# Patient Record
Sex: FEMALE | Race: WHITE | Employment: FULL TIME | URBAN - METROPOLITAN AREA
[De-identification: names, ages, dates, MRNs, and addresses within clinical notes are randomized per-mention and may not be internally consistent; named-entity substitution may affect disease eponyms.]

---

## 2023-07-20 ENCOUNTER — HOSPITAL ENCOUNTER (OUTPATIENT)
Age: 38
Discharge: HOME | End: 2023-07-20
Payer: COMMERCIAL

## 2023-07-20 VITALS
DIASTOLIC BLOOD PRESSURE: 66 MMHG | RESPIRATION RATE: 20 BRPM | OXYGEN SATURATION: 100 % | SYSTOLIC BLOOD PRESSURE: 130 MMHG | TEMPERATURE: 98 F | HEART RATE: 81 BPM

## 2023-07-20 DIAGNOSIS — M79.12 STERNOCLEIDOMASTOID MUSCLE TENDERNESS: Primary | ICD-10-CM

## 2023-07-20 PROCEDURE — 99203 OFFICE O/P NEW LOW 30 MIN: CPT | Performed by: PHYSICIAN ASSISTANT

## 2023-07-20 RX ORDER — CYCLOBENZAPRINE HCL 10 MG
10 TABLET ORAL 3 TIMES DAILY PRN
Qty: 14 TABLET | Refills: 0 | Status: SHIPPED | OUTPATIENT
Start: 2023-07-20 | End: 2023-07-27

## 2023-07-20 RX ORDER — IBUPROFEN 600 MG/1
TABLET ORAL
Qty: 20 TABLET | Refills: 0 | Status: SHIPPED | OUTPATIENT
Start: 2023-07-20

## 2023-07-20 NOTE — ED INITIAL ASSESSMENT (HPI)
Brushed the right side of neck and noticed it to be more prominent and sore . Denies sore throat, uri symptoms, HA or stomach ache. Feel generally well.

## 2023-08-03 ENCOUNTER — OFFICE VISIT (OUTPATIENT)
Dept: INTERNAL MEDICINE CLINIC | Facility: CLINIC | Age: 38
End: 2023-08-03

## 2023-08-03 VITALS
BODY MASS INDEX: 27.77 KG/M2 | SYSTOLIC BLOOD PRESSURE: 113 MMHG | OXYGEN SATURATION: 98 % | TEMPERATURE: 98 F | HEART RATE: 99 BPM | DIASTOLIC BLOOD PRESSURE: 68 MMHG | HEIGHT: 70 IN | WEIGHT: 194 LBS

## 2023-08-03 DIAGNOSIS — E55.9 VITAMIN D DEFICIENCY: ICD-10-CM

## 2023-08-03 DIAGNOSIS — Z00.00 ANNUAL PHYSICAL EXAM: Primary | ICD-10-CM

## 2023-08-03 DIAGNOSIS — E53.8 B12 DEFICIENCY: ICD-10-CM

## 2023-08-03 PROCEDURE — 3074F SYST BP LT 130 MM HG: CPT | Performed by: INTERNAL MEDICINE

## 2023-08-03 PROCEDURE — 99385 PREV VISIT NEW AGE 18-39: CPT | Performed by: INTERNAL MEDICINE

## 2023-08-03 PROCEDURE — 3008F BODY MASS INDEX DOCD: CPT | Performed by: INTERNAL MEDICINE

## 2023-08-03 PROCEDURE — 3078F DIAST BP <80 MM HG: CPT | Performed by: INTERNAL MEDICINE

## 2023-08-03 RX ORDER — FLUOXETINE HYDROCHLORIDE 40 MG/1
40 CAPSULE ORAL DAILY
COMMUNITY
Start: 2023-07-20

## 2023-08-09 ENCOUNTER — LAB ENCOUNTER (OUTPATIENT)
Dept: LAB | Age: 38
End: 2023-08-09
Attending: INTERNAL MEDICINE
Payer: COMMERCIAL

## 2023-08-09 DIAGNOSIS — E55.9 VITAMIN D DEFICIENCY: ICD-10-CM

## 2023-08-09 DIAGNOSIS — Z00.00 ANNUAL PHYSICAL EXAM: ICD-10-CM

## 2023-08-09 DIAGNOSIS — E53.8 B12 DEFICIENCY: ICD-10-CM

## 2023-08-09 LAB
ALBUMIN SERPL-MCNC: 3.6 G/DL (ref 3.4–5)
ALBUMIN/GLOB SERPL: 0.9 {RATIO} (ref 1–2)
ALP LIVER SERPL-CCNC: 102 U/L
ALT SERPL-CCNC: 21 U/L
ANION GAP SERPL CALC-SCNC: 4 MMOL/L (ref 0–18)
AST SERPL-CCNC: 15 U/L (ref 15–37)
BASOPHILS # BLD AUTO: 0.03 X10(3) UL (ref 0–0.2)
BASOPHILS NFR BLD AUTO: 0.6 %
BILIRUB SERPL-MCNC: 0.7 MG/DL (ref 0.1–2)
BUN BLD-MCNC: 8 MG/DL (ref 7–18)
BUN/CREAT SERPL: 11.4 (ref 10–20)
CALCIUM BLD-MCNC: 8.5 MG/DL (ref 8.5–10.1)
CHLORIDE SERPL-SCNC: 107 MMOL/L (ref 98–112)
CHOLEST SERPL-MCNC: 138 MG/DL (ref ?–200)
CO2 SERPL-SCNC: 28 MMOL/L (ref 21–32)
CREAT BLD-MCNC: 0.7 MG/DL
DEPRECATED RDW RBC AUTO: 40.9 FL (ref 35.1–46.3)
EGFRCR SERPLBLD CKD-EPI 2021: 115 ML/MIN/1.73M2 (ref 60–?)
EOSINOPHIL # BLD AUTO: 0.04 X10(3) UL (ref 0–0.7)
EOSINOPHIL NFR BLD AUTO: 0.8 %
ERYTHROCYTE [DISTWIDTH] IN BLOOD BY AUTOMATED COUNT: 12.4 % (ref 11–15)
FASTING PATIENT LIPID ANSWER: YES
FASTING STATUS PATIENT QL REPORTED: YES
GLOBULIN PLAS-MCNC: 4.1 G/DL (ref 2.8–4.4)
GLUCOSE BLD-MCNC: 85 MG/DL (ref 70–99)
HCT VFR BLD AUTO: 40.3 %
HDLC SERPL-MCNC: 52 MG/DL (ref 40–59)
HGB BLD-MCNC: 13 G/DL
IMM GRANULOCYTES # BLD AUTO: 0.01 X10(3) UL (ref 0–1)
IMM GRANULOCYTES NFR BLD: 0.2 %
LDLC SERPL CALC-MCNC: 65 MG/DL (ref ?–100)
LYMPHOCYTES # BLD AUTO: 1.64 X10(3) UL (ref 1–4)
LYMPHOCYTES NFR BLD AUTO: 31.2 %
MCH RBC QN AUTO: 29.1 PG (ref 26–34)
MCHC RBC AUTO-ENTMCNC: 32.3 G/DL (ref 31–37)
MCV RBC AUTO: 90.2 FL
MONOCYTES # BLD AUTO: 0.35 X10(3) UL (ref 0.1–1)
MONOCYTES NFR BLD AUTO: 6.7 %
NEUTROPHILS # BLD AUTO: 3.18 X10 (3) UL (ref 1.5–7.7)
NEUTROPHILS # BLD AUTO: 3.18 X10(3) UL (ref 1.5–7.7)
NEUTROPHILS NFR BLD AUTO: 60.5 %
NONHDLC SERPL-MCNC: 86 MG/DL (ref ?–130)
OSMOLALITY SERPL CALC.SUM OF ELEC: 286 MOSM/KG (ref 275–295)
PLATELET # BLD AUTO: 319 10(3)UL (ref 150–450)
POTASSIUM SERPL-SCNC: 3.6 MMOL/L (ref 3.5–5.1)
PROT SERPL-MCNC: 7.7 G/DL (ref 6.4–8.2)
RBC # BLD AUTO: 4.47 X10(6)UL
SODIUM SERPL-SCNC: 139 MMOL/L (ref 136–145)
TRIGL SERPL-MCNC: 117 MG/DL (ref 30–149)
TSI SER-ACNC: 0.96 MIU/ML (ref 0.36–3.74)
VIT B12 SERPL-MCNC: 371 PG/ML (ref 193–986)
VIT D+METAB SERPL-MCNC: 42.2 NG/ML (ref 30–100)
VLDLC SERPL CALC-MCNC: 18 MG/DL (ref 0–30)
WBC # BLD AUTO: 5.3 X10(3) UL (ref 4–11)

## 2023-08-09 PROCEDURE — 82306 VITAMIN D 25 HYDROXY: CPT

## 2023-08-09 PROCEDURE — 80061 LIPID PANEL: CPT

## 2023-08-09 PROCEDURE — 85025 COMPLETE CBC W/AUTO DIFF WBC: CPT

## 2023-08-09 PROCEDURE — 84443 ASSAY THYROID STIM HORMONE: CPT

## 2023-08-09 PROCEDURE — 36415 COLL VENOUS BLD VENIPUNCTURE: CPT

## 2023-08-09 PROCEDURE — 80053 COMPREHEN METABOLIC PANEL: CPT

## 2023-08-09 PROCEDURE — 82607 VITAMIN B-12: CPT

## 2023-08-10 NOTE — PROGRESS NOTES
Message left for patient that Her labs are okay except her B12 is low normal I will suggest to take over-the-counter 1000 mcg daily.

## 2023-12-18 ENCOUNTER — OFFICE VISIT (OUTPATIENT)
Dept: INTERNAL MEDICINE CLINIC | Facility: CLINIC | Age: 38
End: 2023-12-18

## 2023-12-18 VITALS
HEIGHT: 70 IN | TEMPERATURE: 98 F | WEIGHT: 197 LBS | OXYGEN SATURATION: 99 % | RESPIRATION RATE: 17 BRPM | SYSTOLIC BLOOD PRESSURE: 116 MMHG | BODY MASS INDEX: 28.2 KG/M2 | DIASTOLIC BLOOD PRESSURE: 86 MMHG | HEART RATE: 67 BPM

## 2023-12-18 DIAGNOSIS — M79.652 PAIN OF LEFT THIGH: Primary | ICD-10-CM

## 2023-12-18 PROCEDURE — 3074F SYST BP LT 130 MM HG: CPT | Performed by: INTERNAL MEDICINE

## 2023-12-18 PROCEDURE — 3079F DIAST BP 80-89 MM HG: CPT | Performed by: INTERNAL MEDICINE

## 2023-12-18 PROCEDURE — 3008F BODY MASS INDEX DOCD: CPT | Performed by: INTERNAL MEDICINE

## 2023-12-18 PROCEDURE — 99213 OFFICE O/P EST LOW 20 MIN: CPT | Performed by: INTERNAL MEDICINE

## 2023-12-18 NOTE — PROGRESS NOTES
Subjective:   Patient ID: Sharmin Guerra is a 41year old female. HPI  Patient comes in today with complaint of pain to the back of the thigh right above the knee area started hurting last night feels kind bruise pain with pressing on the area no swelling no redness she was little more active than usual yesterday jumping around with the son. No direct injury trauma no recent travel long distance or sitting down for prolonged time, today feels little bit better but still discomfort and tender       History/Other:   Review of Systems   Constitutional: Negative. HENT: Negative. Eyes: Negative. Respiratory: Negative. Cardiovascular: Negative. Gastrointestinal: Negative. Genitourinary: Negative. Musculoskeletal: Negative. Pain to back or left thigh    Skin: Negative. Neurological: Negative. Psychiatric/Behavioral: Negative. Current Outpatient Medications   Medication Sig Dispense Refill    FLUoxetine HCl 40 MG Oral Cap Take 1 capsule (40 mg total) by mouth daily. JUNEL FE 1/20 1-20 MG-MCG Oral Tab Take 1 tablet by mouth daily. FLUoxetine HCl 10 MG Oral Cap Take 1 capsule (10 mg total) by mouth daily. Allergies: Allergies   Allergen Reactions    Amoxicillin HIVES and RASH     Adverse reaction         Objective:   Physical Exam  Vitals and nursing note reviewed. Constitutional:       Appearance: She is well-developed. HENT:      Head: Normocephalic and atraumatic. Right Ear: External ear normal.      Left Ear: External ear normal.      Nose: Nose normal.   Eyes:      Conjunctiva/sclera: Conjunctivae normal.      Pupils: Pupils are equal, round, and reactive to light. Cardiovascular:      Rate and Rhythm: Normal rate and regular rhythm. Heart sounds: Normal heart sounds. Pulmonary:      Effort: Pulmonary effort is normal.      Breath sounds: Normal breath sounds.    Abdominal:      General: Bowel sounds are normal.      Palpations: Abdomen is soft.   Genitourinary:     Vagina: Normal.   Musculoskeletal:         General: Tenderness present. Normal range of motion. Cervical back: Normal range of motion and neck supple. Comments: Pain to back or left thigh, pain with pressure,    Skin:     General: Skin is warm and dry. Neurological:      Mental Status: She is alert and oriented to person, place, and time. Deep Tendon Reflexes: Reflexes are normal and symmetric. Psychiatric:         Behavior: Behavior normal.         Thought Content: Thought content normal.         Judgment: Judgment normal.         Assessment & Plan:   1. Pain of left thigh most likley a muscle /ligament strain or small tear, rest, ice as need med for pain , unlikley a dvt witht the presnting symptms, let us know if not better        No orders of the defined types were placed in this encounter.       Meds This Visit:  Requested Prescriptions      No prescriptions requested or ordered in this encounter       Imaging & Referrals:  None

## 2024-02-16 ENCOUNTER — HOSPITAL ENCOUNTER (OUTPATIENT)
Age: 39
Discharge: HOME | End: 2024-02-16
Payer: COMMERCIAL

## 2024-02-16 VITALS
DIASTOLIC BLOOD PRESSURE: 61 MMHG | HEART RATE: 109 BPM | SYSTOLIC BLOOD PRESSURE: 110 MMHG | OXYGEN SATURATION: 98 % | RESPIRATION RATE: 18 BRPM | TEMPERATURE: 97 F

## 2024-02-16 DIAGNOSIS — J02.9 SORE THROAT: ICD-10-CM

## 2024-02-16 DIAGNOSIS — J02.0 STREP PHARYNGITIS: Primary | ICD-10-CM

## 2024-02-16 LAB
POCT INFLUENZA A: NEGATIVE
POCT INFLUENZA B: NEGATIVE
S PYO AG THROAT QL: POSITIVE

## 2024-02-16 PROCEDURE — 87880 STREP A ASSAY W/OPTIC: CPT | Performed by: NURSE PRACTITIONER

## 2024-02-16 PROCEDURE — 87502 INFLUENZA DNA AMP PROBE: CPT | Performed by: NURSE PRACTITIONER

## 2024-02-16 PROCEDURE — 99213 OFFICE O/P EST LOW 20 MIN: CPT | Performed by: NURSE PRACTITIONER

## 2024-02-16 RX ORDER — AZITHROMYCIN 500 MG/1
500 TABLET, FILM COATED ORAL DAILY
Qty: 5 TABLET | Refills: 0 | Status: SHIPPED | OUTPATIENT
Start: 2024-02-16 | End: 2024-02-21

## 2024-02-16 NOTE — DISCHARGE INSTRUCTIONS
Take Tylenol and/or ibuprofen as needed for pain or fever.  Drink plenty of fluids, rest is much as possible.  Take the antibiotic as prescribed for the next 5 days.  Take with food.  Thank you for choosing our [facility] for your medical condition today. Please be sure to follow up with your primary care provider in 2 days if no improvement, or as directed. If any worsening of your symptoms or other concerns call your primary care provider, return here or go to the emergency department.

## 2024-02-16 NOTE — ED PROVIDER NOTES
Chief Complaint   Patient presents with    Cough/URI    Sore Throat       HPI:     Janice Lara is a 38 year old female who presents with a chief complaint of sore throat, fatigue, chills, sweating, headache, tactile fever, and nausea for the past 3 days.  Patient did not check her temperature at home with a thermometer.  She denies vomiting or diarrhea.  She denies difficulty breathing, chest pain or abdominal pain.  She denies rhinorrhea or cough.  She states her child was ill last week with similar symptoms that was a virus.  She does work in a school.      PFSH    PFS asessment screens reviewed and agree.  Nurses notes reviewed I agree with documentation.    Family History   Problem Relation Age of Onset    Other (liver) Father     No Known Problems Mother     Diabetes Maternal Grandmother     Diabetes Paternal Grandmother      Family history is reviewed and is as noted in HPI.  Social History     Socioeconomic History    Marital status:      Spouse name: Not on file    Number of children: Not on file    Years of education: Not on file    Highest education level: Not on file   Occupational History    Not on file   Tobacco Use    Smoking status: Former     Years: 5     Types: Cigarettes    Smokeless tobacco: Never   Vaping Use    Vaping Use: Former   Substance and Sexual Activity    Alcohol use: Not Currently    Drug use: Never    Sexual activity: Yes     Partners: Male   Other Topics Concern    Not on file   Social History Narrative    Not on file     Social Determinants of Health     Financial Resource Strain: Not on file   Food Insecurity: Not on file   Transportation Needs: Not on file   Physical Activity: Not on file   Stress: Not on file   Social Connections: Not on file   Housing Stability: Not on file         ROS:   Positive for stated complaint: Sore throat.  All other systems reviewed and negative except as noted above.  Constitutional and Vital Signs Reviewed.      Physical Exam:      Findings:    /61   Pulse 109   Temp 97.3 °F (36.3 °C) (Temporal)   Resp 18   LMP 01/29/2024 (Approximate)   SpO2 98%   GENERAL: well developed, well nourished, well hydrated, no distress  SKIN: good skin turgor, no obvious rashes  NECK: supple, mild bilateral anterior cervical LAD.  CARDIO: RRR without murmur, Cap refill brisk  EXTREMITIES: NAYAK without difficulty  GI: soft, non-tender to palpation  HEAD: normocephalic, atraumatic, no facial asymmetry  EYES: bilateral sclera non icteric, no conjunctival injection or discharge, no periorbital swelling  EARS: Bilateral EACs clear, TMs without erythema or bulging, COL wnl,   NOSE: nasal mucosa pink, no discharge or bleeding  THROAT: airway patent, no intraoral lesions.  Bilateral tonsils 2+, cryptic, and mildly erythematous.  No exudates, uvula midline,   LUNGS: Full symmetric AE, clear to auscultation bilaterally; no rales, rhonchi, or wheezes, No signs of increased WOB  NEURO: No observed focal deficits, speech clear  PSYCH: Alert and oriented x3.  Answering questions appropriately.  Mood appropriate.    MDM/Assessment/Plan:   Orders for this encounter:    Orders Placed This Encounter    POCT Rapid Strep    POCT Flu Test     Order Specific Question:   Release to patient     Answer:   Immediate    POCT Rapid Strep    azithromycin 500 MG Oral Tab     Sig: Take 1 tablet (500 mg total) by mouth daily for 5 days.     Dispense:  5 tablet     Refill:  0       Labs performed this visit:  Recent Results (from the past 10 hour(s))   POCT Flu Test    Collection Time: 02/16/24 12:11 PM    Specimen: Nares; Other   Result Value Ref Range    POCT INFLUENZA A Negative Negative    POCT INFLUENZA B Negative Negative   POCT Rapid Strep    Collection Time: 02/16/24 12:20 PM   Result Value Ref Range    POCT Rapid Strep Positive (A) Negative       MDM:  Differential diagnosis includes strep pharyngitis, viral pharyngitis, viral upper respiratory infection, influenza, viral  syndrome.  Will check rapid strep and influenza at this time.  Rapid strep is positive, influenza is negative will treat with azithromycin as patient is allergic to amoxicillin.  Given discharge instructions and advised to follow-up with primary care provider next week.     Counseled: Patient, regarding diagnosis, regarding treatment plan, regarding diagnostic results, regarding prescription, I have discussed with the patient the results of tests, differential diagnosis, and warning signs and symptoms that should prompt immediate return. The patient understands these instructions and agrees to the follow-up plan provided. There is no barriers to learning. Appropriate f/u given. Emergency precautions given. Patient agrees to return for any concerns/ problems/complications.      Diagnosis:    ICD-10-CM    1. Strep pharyngitis  J02.0       2. Sore throat  J02.9 POCT Flu Test     POCT Flu Test          All results reviewed and discussed with patient.  See AVS for detailed discharge instructions for your condition today.    Follow Up with:  Guerda Marrufo DO  [address]  [place] [address]  [phone]    In 3 days

## 2024-02-18 ENCOUNTER — HOSPITAL ENCOUNTER (OUTPATIENT)
Age: 39
Discharge: HOME | End: 2024-02-18
Payer: COMMERCIAL

## 2024-02-18 ENCOUNTER — E-VISIT (OUTPATIENT)
Dept: TELEHEALTH | Age: 39
End: 2024-02-18
Payer: COMMERCIAL

## 2024-02-18 VITALS
OXYGEN SATURATION: 100 % | HEART RATE: 90 BPM | SYSTOLIC BLOOD PRESSURE: 108 MMHG | RESPIRATION RATE: 18 BRPM | DIASTOLIC BLOOD PRESSURE: 66 MMHG | TEMPERATURE: 97 F

## 2024-02-18 DIAGNOSIS — H10.9 BACTERIAL CONJUNCTIVITIS OF RIGHT EYE: Primary | ICD-10-CM

## 2024-02-18 DIAGNOSIS — J02.9 PHARYNGITIS, UNSPECIFIED ETIOLOGY: ICD-10-CM

## 2024-02-18 DIAGNOSIS — J03.90 TONSILLITIS: ICD-10-CM

## 2024-02-18 DIAGNOSIS — Z02.9 ADMINISTRATIVE ENCOUNTER: Primary | ICD-10-CM

## 2024-02-18 RX ORDER — POLYMYXIN B SULFATE AND TRIMETHOPRIM 1; 10000 MG/ML; [USP'U]/ML
1 SOLUTION OPHTHALMIC
Qty: 10 ML | Refills: 0 | Status: SHIPPED | OUTPATIENT
Start: 2024-02-18 | End: 2024-02-23

## 2024-02-18 RX ORDER — DEXAMETHASONE SODIUM PHOSPHATE 10 MG/ML
10 INJECTION, SOLUTION INTRAMUSCULAR; INTRAVENOUS ONCE
Status: COMPLETED | OUTPATIENT
Start: 2024-02-18 | End: 2024-02-18

## 2024-02-18 NOTE — ED PROVIDER NOTES
Patient Seen in: [facility]      History     Chief Complaint   Patient presents with    Conjunctivitis     Stated Complaint: eye issue    Subjective:   HPI    Patient is a healthy 38-year-old female who presents to immediate care due to red eye x 2 days.  Notes increased redness and purulent drainage this morning of her right eye.  States currently she is being treated for strep pharyngitis with azithromycin.  Notes minor improvement in sore throat but notes persistent sinus congestion and postnasal drip.  Has been taking Tylenol ibuprofen for pain.  Denies fever difficulty swallowing, drooling, cough.    Objective:   Past Medical History:   Diagnosis Date    Anxiety     Cervical dysplasia 2011    Mild    Depression     Dysmenorrhea     Nervous disorder     Seasonal allergies     spring    Varicella               History reviewed. No pertinent surgical history.             Social History     Socioeconomic History    Marital status:    Tobacco Use    Smoking status: Former     Years: 5     Types: Cigarettes    Smokeless tobacco: Never   Vaping Use    Vaping Use: Former   Substance and Sexual Activity    Alcohol use: Not Currently    Drug use: Never    Sexual activity: Yes     Partners: Male              Review of Systems    Positive for stated complaint: eye issue  Other systems are as noted in HPI.  Constitutional and vital signs reviewed.      All other systems reviewed and negative except as noted above.    Physical Exam     ED Triage Vitals [02/18/24 0825]   /66   Pulse 90   Resp 18   Temp 97 °F (36.1 °C)   Temp src Temporal   SpO2 100 %   O2 Device None (Room air)       Current:/66   Pulse 90   Temp 97 °F (36.1 °C) (Temporal)   Resp 18   LMP 01/29/2024 (Approximate)   SpO2 100%     Right Eye Chart Acuity: 20/40, Corrected  Left Eye Chart Acuity: 20/40, Corrected    Physical Exam    Vital signs reviewed. Nursing note reviewed.  Constitutional: Well-developed. Well-nourished.  In no acute distress  HENT: Mucous membranes moist. TMs intact bilaterally. No trismus. Uvula midline. Mild posterior pharynx erythema.  Enlarged bilateral tonsils with exudates.  no petechiae, or posterior pharynx edema.  EYES: right conjunctival injection. PERRL EOMI without pain.   NECK: Full ROM. Supple.   CARDIAC: Normal rate. Normal S1/ S2. 2+ distal pulses. No edema  PULM/CHEST: Clear to auscultation bilaterally. No wheezes  Extremities: Full ROM  NEURO: Awake, alert, following commands, moving extremities, answering questions.   SKIN: Warm and dry. No rash or lesions.  PSYCH: Normal judgment. Normal affect.        ED Course   Labs Reviewed - No data to display                   MDM      Patient is a 38-year-old female that presents to immediate care due to right red eye x 2 days.  Patient arrives with stable vitals speaking complete sentences in no respiratory distress.  Physical exam showing mild injection of right conjunctiva.  Most likely bacterial conjunctivitis secondary to recent URI, viral illness.  Less likely viral conjunctivitis, corneal abrasion, uveitis.  Will prescribe Polytrim eyedrops for presumed bacterial conjunctivitis.  Patient additionally being treated for strep pharyngitis with azithromycin.  Notes persistent pain.  On physical exam patient having bilateral enlarged tonsils with exudates.    Unlikely PTA or retropharyngeal abscess.  Most likely tonsillitis secondary to strep pharyngitis. Due to persistent pain, will give dose of 10 mg Decadron prior to discharge.  Encourage patient to continue Tylenol ibuprofen for pain.  For sinus congestion postnasal drip to begin taking antihistamine such as Claritin Zyrtec, decongestant such as Sudafed.  History given by patient.  Patient agreeable to plan all questions answered.  Return precautions including worsening pain difficulty swallowing drooling.                                   Medical Decision Making      Disposition and Plan      Clinical Impression:  1. Bacterial conjunctivitis of right eye    2. Pharyngitis, unspecified etiology    3. Tonsillitis         Disposition:  Discharge  2/18/2024  8:42 am    Follow-up:  Guerda Marrufo DO  [address]  [place] [address]  [phone]    Call             Medications Prescribed:  Discharge Medication List as of 2/18/2024  8:47 AM        START taking these medications    Details   polymyxin B-trimethoprim 12488-3.1 UNIT/ML-% Ophthalmic Solution Apply 1 drop to eye Q3H While Awake for 5 days., Normal, Disp-10 mL, R-0

## 2024-02-18 NOTE — ED INITIAL ASSESSMENT (HPI)
Patient reports right eye redness and crust since yesterday. Patient reports being on day three of abx for strep throat.

## 2024-02-18 NOTE — PROGRESS NOTES
Pt submitted evisit for eye redness and discharge.   Upon reviewing chart, noted pt seen at [facility] today for same complaint and prescribed antibiotic drops.   Message sent to pt that evisit will not be completed.

## 2024-02-18 NOTE — DISCHARGE INSTRUCTIONS
Take antihistamines and decongestants for sinus congestion and postnasal drip cough.  At nighttime take 1 to 2 tablets, 25 mg to 50 mg tablets of diphenhydramine (Benadryl) to help with sinus congestion and cough.  You may additionally take 1 tablet of Claritin or Zyrtec during the day to help with sinus congestion.  For added decongestion relief, you may additionally take pseudoephedrine (Sudafed).  Take Tylenol and ibuprofen as needed for pain.  Seek prompt medical reevaluation if you begin to have worsening pain, difficulty swallowing, drooling, shortness of breath or chest pain.

## 2024-02-24 ENCOUNTER — HOSPITAL ENCOUNTER (OUTPATIENT)
Age: 39
Discharge: HOME | End: 2024-02-24
Payer: COMMERCIAL

## 2024-02-24 VITALS
SYSTOLIC BLOOD PRESSURE: 120 MMHG | DIASTOLIC BLOOD PRESSURE: 72 MMHG | RESPIRATION RATE: 16 BRPM | OXYGEN SATURATION: 97 % | TEMPERATURE: 98 F | HEART RATE: 88 BPM

## 2024-02-24 DIAGNOSIS — H57.89 EYE IRRITATION: Primary | ICD-10-CM

## 2024-02-24 PROCEDURE — 99213 OFFICE O/P EST LOW 20 MIN: CPT | Performed by: PHYSICIAN ASSISTANT

## 2024-02-24 RX ORDER — CIPROFLOXACIN HYDROCHLORIDE 3.5 MG/ML
2 SOLUTION/ DROPS TOPICAL SEE ADMIN INSTRUCTIONS
Qty: 5 ML | Refills: 0 | Status: SHIPPED | OUTPATIENT
Start: 2024-02-24 | End: 2024-03-02

## 2024-02-24 NOTE — ED PROVIDER NOTES
Patient Seen in: [facility]      History     Chief Complaint   Patient presents with    Eye Problem     Stated Complaint: Eye Problem    Subjective:   HPI    38-year-old female presents for persistent mild irritation in bilateral eyes.  Patient recently treated with antibiotic eyedrops for presumed conjunctivitis.  Patient states symptoms started in her right eye and then transferred to her left eye throughout the week.  Reports persistent mild film over the eye and scant discharge upon wakening.  Mild eye redness. No visual disturbances. No FB sensation.  Patient does wear contacts but has not been wearing in the last week.  Does not routinely sleep with her contacts in.  Denies any other symptoms.  No history of autoimmune disease.      Objective:   Past Medical History:   Diagnosis Date    Anxiety     Cervical dysplasia 2011    Mild    Depression     Dysmenorrhea     Nervous disorder     Seasonal allergies     spring    Varicella               History reviewed. No pertinent surgical history.             Social History     Socioeconomic History    Marital status:    Tobacco Use    Smoking status: Former     Years: 5     Types: Cigarettes    Smokeless tobacco: Never   Vaping Use    Vaping Use: Former   Substance and Sexual Activity    Alcohol use: Not Currently    Drug use: Never    Sexual activity: Yes     Partners: Male              Review of Systems    Positive for stated complaint: Eye Problem  Other systems are as noted in HPI.  Constitutional and vital signs reviewed.      All other systems reviewed and negative except as noted above.    Physical Exam     ED Triage Vitals [02/24/24 0831]   /72   Pulse 88   Resp 16   Temp 97.7 °F (36.5 °C)   Temp src Temporal   SpO2 97 %   O2 Device None (Room air)       Current:/72   Pulse 88   Temp 97.7 °F (36.5 °C) (Temporal)   Resp 16   LMP 01/29/2024 (Approximate)   SpO2 97%         Physical Exam  Vitals and nursing note reviewed.    Constitutional:       General: She is not in acute distress.     Appearance: Normal appearance. She is not ill-appearing or toxic-appearing.   HENT:      Head: Normocephalic.      Nose: Nose normal.      Mouth/Throat:      Mouth: Mucous membranes are moist.   Eyes:      Comments: Minimally injected conjunctival bilaterally.  No discharge.  No appreciated foreign body.  Eyelids unremarkable.  Both eyes visualized under fluorescein stain.  No corneal ulcer or appreciated uptake.   Cardiovascular:      Rate and Rhythm: Normal rate.   Pulmonary:      Effort: Pulmonary effort is normal. No respiratory distress.   Musculoskeletal:         General: Normal range of motion.      Cervical back: Normal range of motion.   Skin:     General: Skin is warm.   Neurological:      General: No focal deficit present.      Mental Status: She is alert.   Psychiatric:         Mood and Affect: Mood normal.         Behavior: Behavior normal.             ED Course   Labs Reviewed - No data to display                 MDM                                      Medical Decision Making    38-year-old female returns with persistent bilateral eye irritation after recent diagnosis of conjunctivitis.  Mild relief after antibiotic drops.  Differential corneal ulcer, allergic conjunctivitis, uveitis. Patient is a contact lens wear.  Has not been wearing her contacts.  Vital signs are stable.  Visual acuity is reassuring.  Fluorescein exam on bilateral eyes unremarkable. No systemic symptoms. Home with cipro eye drops for pseudomonas coverage. Advised sx care. F/U with ophthalmologist if symptoms persists. ER precautions advised.     Disposition and Plan     Clinical Impression:  1. Eye irritation         Disposition:  Discharge  2/24/2024  8:53 am    Follow-up:  Guerda Marrufo DO  [address]  [place] [address]  [phone]          Craig Winters MD  [address] [facility]  [address]  [facility]  [address]  [phone]      Opthalmology    [facility] [address] [address]  [phone]              Medications Prescribed:  Discharge Medication List as of 2/24/2024  8:55 AM

## 2024-02-24 NOTE — ED INITIAL ASSESSMENT (HPI)
Patient is here with drainage from her eyes still even though she was treated for her eye infection last week.

## 2024-03-10 ENCOUNTER — APPOINTMENT (OUTPATIENT)
Dept: GENERAL RADIOLOGY | Age: 39
End: 2024-03-10
Attending: NURSE PRACTITIONER
Payer: COMMERCIAL

## 2024-03-10 ENCOUNTER — HOSPITAL ENCOUNTER (OUTPATIENT)
Age: 39
Discharge: HOME | End: 2024-03-10
Payer: COMMERCIAL

## 2024-03-10 VITALS
HEART RATE: 72 BPM | DIASTOLIC BLOOD PRESSURE: 71 MMHG | RESPIRATION RATE: 18 BRPM | OXYGEN SATURATION: 99 % | TEMPERATURE: 98 F | SYSTOLIC BLOOD PRESSURE: 124 MMHG

## 2024-03-10 DIAGNOSIS — S93.401A MILD SPRAIN OF RIGHT ANKLE, INITIAL ENCOUNTER: Primary | ICD-10-CM

## 2024-03-10 PROCEDURE — A6448 LT COMPRES BAND <3"/YD: HCPCS | Performed by: NURSE PRACTITIONER

## 2024-03-10 PROCEDURE — 99213 OFFICE O/P EST LOW 20 MIN: CPT | Performed by: NURSE PRACTITIONER

## 2024-03-10 PROCEDURE — 73610 X-RAY EXAM OF ANKLE: CPT | Performed by: NURSE PRACTITIONER

## 2024-03-10 PROCEDURE — L4350 ANKLE CONTROL ORTHO PRE OTS: HCPCS | Performed by: NURSE PRACTITIONER

## 2024-03-10 NOTE — DISCHARGE INSTRUCTIONS
Rest from exacerbating activities for the next week. Apply ice/cold compress for 20min at a time 4-6x daily for the next 2-3 days. Keep the right foot elevated when resting as much as possible. You may take Motrin every 6 hours as needed for pain. Take this with food. If additional pain control is needed, you may also take Tylenol every 6 hours. Follow up with your primary provider or the orthopedic specialist provided in your discharge instructions in the next 2-3 days. Seek additional care in the ER for new or worsening symptoms, fever or increasing pain.

## 2024-03-10 NOTE — ED PROVIDER NOTES
Patient Seen in: [facility]    History   CC: ankle pain  HPI: Janice Lara 38 year old female  who presents c/o right lateral ankle pain s[place] states she stepped on uneven surface causing her ankle to fall and her to fall. Denies pain/injury elsewhere associated with fall. Denies numbness, tingling, weakness or limitation in range of motion associated.  Localizes most significant area of pain to the right lateral ankle.  No open wounds.    Past Medical History:   Diagnosis Date    Anxiety     Cervical dysplasia 2011    Mild    Depression     Dysmenorrhea     Nervous disorder     Seasonal allergies     spring    Varicella        History reviewed. No pertinent surgical history.    Family History   Problem Relation Age of Onset    Other (liver) Father     No Known Problems Mother     Diabetes Maternal Grandmother     Diabetes Paternal Grandmother        Social History     Socioeconomic History    Marital status:    Tobacco Use    Smoking status: Former     Years: 5     Types: Cigarettes    Smokeless tobacco: Never   Vaping Use    Vaping Use: Former   Substance and Sexual Activity    Alcohol use: Not Currently    Drug use: Never    Sexual activity: Yes     Partners: Male       ROS:  Review of Systems    Positive for stated complaint: Ankle Pain  Other systems are as noted in HPI.  Constitutional and vital signs reviewed.      All other systems reviewed and negative except as noted above.    PSFH elements reviewed from today and agreed except as otherwise stated in HPI.             Constitutional and vital signs reviewed.        Physical Exam     ED Triage Vitals [03/10/24 1123]   /71   Pulse 72   Resp 18   Temp 97.7 °F (36.5 °C)   Temp src Oral   SpO2 99 %   O2 Device None (Room air)       Current:/71   Pulse 72   Temp 97.7 °F (36.5 °C) (Oral)   Resp 18   LMP 03/04/2024 (Exact Date)   SpO2 99%         PE:  General - Appears well, non-toxic  and in NAD  Head - Appears symmetrical without deformity/swelling cranium, scalp, or facial bones  Eyes - sclera not injected, no discharge noted, no periorbital edema  Skin - swelling noted to right lateral ankle without open wounds or ecchymosis.  Skin is otherwise pink warm and dry throughout, mmm, cap refill <2seconds distal LE  Neuro - A&O x4, sensation equal to both medial and lateral aspects of lower extremities, steady gait  MSK - makes purposeful movements of all LE with full ROM noted, foot press/dorsiflexion strength equal bilat, pedal pulses 2+ bilat.  + Tenderness is elicited with palpation to the right lateral malleolus.  No medial malleoli or, foot overlying metatarsals 1 through 5, toe, shin, calf, knee or thigh tenderness to the right lower extremity  Psych - Interactive and appropriate      ED Course   Labs Reviewed - No data to display    MDM     XR ANKLE (MIN 3 VIEWS), RIGHT (CPT=73610) (Final result)  Result time 03/10/24 12:02:39  Final result by Jt Hutton MD (03/10/24 12:02:39)                Impression:    CONCLUSION:  Soft tissue swelling of the right ankle without radiographic evidence of underlying osseous injury.           Dictated by (CST): Jt Hutton MD on 3/10/2024 at 12:01 PM      Finalized by (CST): Jt Hutton MD on 3/10/2024 at 12:02 PM                  Narrative:    PROCEDURE: XR ANKLE (MIN 3 VIEWS), RIGHT (CPT=73610)     COMPARISON: None available.     INDICATIONS: Lateral and posterior right ankle pain and swelling following acute rolling injury 1 day previously.     TECHNIQUE: 3 views were obtained without weightbearing technique.       FINDINGS:  BONES: No acute fracture or dislocation is evident. No suspicious osseous lesions are seen. The ankle mortise is maintained. The joint spaces are preserved without evidence of significant arthropathy.  SOFT TISSUES: Circumferential soft tissue swelling is apparent. Negative for discernible radiopaque foreign  body.  EFFUSION: None visible.                DDx: Fracture versus sprain versus contusion    X-ray results as noted above and independently reviewed by this provider without osseous abnormality.  Discussed general sprain/strain instructions, rest, ice, elevation, Tylenol or Motrin as needed for discomfort, Ace wrap and Aircast was provided by immediate care and indications/precautions on use reviewed, follow-up and return/ED precautions reviewed.  Patient is historian and demonstrates understanding of all instruction and agrees with plan of care.      Disposition and Plan     Clinical Impression:  1. Mild sprain of right ankle, initial encounter        Disposition:  Discharge    Follow-up:  Guerda Marrufo,   [address]  [facility] [address]  [phone]    Schedule an appointment as soon as possible for a visit in 2 days      Franci Dela Cruz, DPTYLER  [address] [address]  [phone]    Schedule an appointment as soon as possible for a visit in 2 days        Medications Prescribed:  Discharge Medication List as of 3/10/2024 12:05 PM

## 2024-10-22 ENCOUNTER — OFFICE VISIT (OUTPATIENT)
Dept: INTERNAL MEDICINE CLINIC | Facility: CLINIC | Age: 39
End: 2024-10-22

## 2024-10-22 VITALS
TEMPERATURE: 98 F | OXYGEN SATURATION: 99 % | HEART RATE: 68 BPM | SYSTOLIC BLOOD PRESSURE: 117 MMHG | HEIGHT: 70 IN | DIASTOLIC BLOOD PRESSURE: 69 MMHG | WEIGHT: 192 LBS | BODY MASS INDEX: 27.49 KG/M2

## 2024-10-22 DIAGNOSIS — E01.0 THYROMEGALY: ICD-10-CM

## 2024-10-22 DIAGNOSIS — R14.0 ABDOMINAL BLOATING: ICD-10-CM

## 2024-10-22 DIAGNOSIS — E55.9 VITAMIN D DEFICIENCY: ICD-10-CM

## 2024-10-22 DIAGNOSIS — Z00.00 ANNUAL PHYSICAL EXAM: Primary | ICD-10-CM

## 2024-10-22 LAB
ALBUMIN SERPL-MCNC: 4.7 G/DL (ref 3.2–4.8)
ALBUMIN/GLOB SERPL: 1.6 {RATIO} (ref 1–2)
ALP LIVER SERPL-CCNC: 116 U/L
ALT SERPL-CCNC: 15 U/L
ANION GAP SERPL CALC-SCNC: 7 MMOL/L (ref 0–18)
AST SERPL-CCNC: 19 U/L (ref ?–34)
BASOPHILS # BLD AUTO: 0.04 X10(3) UL (ref 0–0.2)
BASOPHILS NFR BLD AUTO: 0.5 %
BILIRUB SERPL-MCNC: 0.5 MG/DL (ref 0.3–1.2)
BUN BLD-MCNC: 9 MG/DL (ref 9–23)
BUN/CREAT SERPL: 13.2 (ref 10–20)
CALCIUM BLD-MCNC: 9.3 MG/DL (ref 8.7–10.4)
CHLORIDE SERPL-SCNC: 107 MMOL/L (ref 98–112)
CHOLEST SERPL-MCNC: 128 MG/DL (ref ?–200)
CO2 SERPL-SCNC: 27 MMOL/L (ref 21–32)
CREAT BLD-MCNC: 0.68 MG/DL
DEPRECATED RDW RBC AUTO: 40.4 FL (ref 35.1–46.3)
EGFRCR SERPLBLD CKD-EPI 2021: 115 ML/MIN/1.73M2 (ref 60–?)
EOSINOPHIL # BLD AUTO: 0.1 X10(3) UL (ref 0–0.7)
EOSINOPHIL NFR BLD AUTO: 1.3 %
ERYTHROCYTE [DISTWIDTH] IN BLOOD BY AUTOMATED COUNT: 12.2 % (ref 11–15)
GLOBULIN PLAS-MCNC: 2.9 G/DL (ref 2–3.5)
GLUCOSE BLD-MCNC: 85 MG/DL (ref 70–99)
HCT VFR BLD AUTO: 37.4 %
HDLC SERPL-MCNC: 49 MG/DL (ref 40–59)
HGB BLD-MCNC: 13 G/DL
IMM GRANULOCYTES # BLD AUTO: 0.01 X10(3) UL (ref 0–1)
IMM GRANULOCYTES NFR BLD: 0.1 %
LDLC SERPL CALC-MCNC: 61 MG/DL (ref ?–100)
LYMPHOCYTES # BLD AUTO: 3.16 X10(3) UL (ref 1–4)
LYMPHOCYTES NFR BLD AUTO: 40.6 %
MCH RBC QN AUTO: 31.4 PG (ref 26–34)
MCHC RBC AUTO-ENTMCNC: 34.8 G/DL (ref 31–37)
MCV RBC AUTO: 90.3 FL
MONOCYTES # BLD AUTO: 0.55 X10(3) UL (ref 0.1–1)
MONOCYTES NFR BLD AUTO: 7.1 %
NEUTROPHILS # BLD AUTO: 3.93 X10 (3) UL (ref 1.5–7.7)
NEUTROPHILS # BLD AUTO: 3.93 X10(3) UL (ref 1.5–7.7)
NEUTROPHILS NFR BLD AUTO: 50.4 %
NONHDLC SERPL-MCNC: 79 MG/DL (ref ?–130)
OSMOLALITY SERPL CALC.SUM OF ELEC: 290 MOSM/KG (ref 275–295)
PLATELET # BLD AUTO: 341 10(3)UL (ref 150–450)
POTASSIUM SERPL-SCNC: 4.1 MMOL/L (ref 3.5–5.1)
PROT SERPL-MCNC: 7.6 G/DL (ref 5.7–8.2)
RBC # BLD AUTO: 4.14 X10(6)UL
SODIUM SERPL-SCNC: 141 MMOL/L (ref 136–145)
TRIGL SERPL-MCNC: 94 MG/DL (ref 30–149)
TSI SER-ACNC: 0.89 MIU/ML (ref 0.55–4.78)
VIT D+METAB SERPL-MCNC: 39.3 NG/ML (ref 30–100)
VLDLC SERPL CALC-MCNC: 14 MG/DL (ref 0–30)
WBC # BLD AUTO: 7.8 X10(3) UL (ref 4–11)

## 2024-10-22 PROCEDURE — 3078F DIAST BP <80 MM HG: CPT | Performed by: INTERNAL MEDICINE

## 2024-10-22 PROCEDURE — 99395 PREV VISIT EST AGE 18-39: CPT | Performed by: INTERNAL MEDICINE

## 2024-10-22 PROCEDURE — 3008F BODY MASS INDEX DOCD: CPT | Performed by: INTERNAL MEDICINE

## 2024-10-22 PROCEDURE — 36415 COLL VENOUS BLD VENIPUNCTURE: CPT | Performed by: INTERNAL MEDICINE

## 2024-10-22 PROCEDURE — 3074F SYST BP LT 130 MM HG: CPT | Performed by: INTERNAL MEDICINE

## 2024-10-22 RX ORDER — NORETHINDRONE ACETATE AND ETHINYL ESTRADIOL AND FERROUS FUMARATE 1MG-20(24)
1 KIT ORAL DAILY
COMMUNITY

## 2024-10-22 NOTE — PROGRESS NOTES
HPI:   Janice Lara is a 39 year old female who presents for a complete physical exam.  For the last couple of months she is complaining of abdominal bloating and gassy feeling  Wt Readings from Last 3 Encounters:   10/22/24 192 lb (87.1 kg)   12/18/23 197 lb (89.4 kg)   08/03/23 194 lb (88 kg)     Body mass index is 27.55 kg/m².     Cholesterol, Total (mg/dL)   Date Value   08/09/2023 138     HDL Cholesterol (mg/dL)   Date Value   08/09/2023 52     LDL Cholesterol (mg/dL)   Date Value   08/09/2023 65     AST (U/L)   Date Value   08/09/2023 15     ALT (U/L)   Date Value   08/09/2023 21        Current Outpatient Medications   Medication Sig Dispense Refill    FLUoxetine HCl 40 MG Oral Cap Take 1 capsule (40 mg total) by mouth daily.      FLUoxetine HCl 10 MG Oral Cap Take 1 capsule (10 mg total) by mouth daily.      Norethin Ace-Eth Estrad-FE (AUROVELA 24 FE) 1-20 MG-MCG(24) Oral Tab Take 1 tablet by mouth daily.        Past Medical History:    Anxiety    Cervical dysplasia    Mild    Depression    Dysmenorrhea    Nervous disorder    Seasonal allergies    spring    Varicella      History reviewed. No pertinent surgical history.   Family History   Problem Relation Age of Onset    Other (liver) Father     No Known Problems Mother     Diabetes Maternal Grandmother     Diabetes Paternal Grandmother       Social History:   Social History     Socioeconomic History    Marital status:    Tobacco Use    Smoking status: Former     Types: Cigarettes    Smokeless tobacco: Never   Vaping Use    Vaping status: Former   Substance and Sexual Activity    Alcohol use: Not Currently    Drug use: Never    Sexual activity: Yes     Partners: Male     Social Drivers of Health     Financial Resource Strain: Low Risk  (6/20/2022)    [facility], [facility]    Overall Financial Resource Strain (CARDIA)     Difficulty of Paying Living Expenses: Not hard at all   Food  Insecurity: No Food Insecurity (6/20/2022)    [facility], [facility]    Hunger Vital Sign     Worried About Running Out of Food in the Last Year: Never true     Ran Out of Food in the Last Year: Never true   Transportation Needs: No Transportation Needs (6/20/2022)    Received from [facility]    PRAPARE - Transportation     Lack of Transportation (Medical): No     Lack of Transportation (Non-Medical): No   Physical Activity: Inactive (6/20/2022)    Received from [facility]    Exercise Vital Sign     Days of Exercise per Week: 0 days     Minutes of Exercise per Session: 0 min   Stress: No Stress Concern Present (6/20/2022)    Received from [facility]    [place] [employer] - Occupational Stress Questionnaire     Feeling of Stress : Not at all   Social Connections: Moderately Isolated (6/20/2022)    Received from [facility]    Social Connection and Isolation Panel [NHANES]     Frequency of Communication with Friends and Family: Twice a week     Frequency of Social Gatherings with Friends and Family: Once a week     Attends Jain Services: Never     Active Member of Clubs or Organizations: No     Attends Club or Organization Meetings: Never     Marital Status:     [facility]     Exercise:  twice per week.  Diet: watches minimally     REVIEW OF SYSTEMS:     Constitutional Negative Chills, fatigue and fever.   ENMT Negative Hearing loss, nasal drainage, pain in/around ear, sinus pressure and sore throat.   Eyes Negative Eye pain and vision changes.   Respiratory Negative Cough, dyspnea and wheezing.   Cardio Negative Chest pain, claudication, edema and irregular  heartbeat/palpitations.   GI Negative Abdominal pain, blood in stool, constipation, diarrhea, heartburn, nausea and vomiting.    Negative Dysuria, hematuria, urinary incontinence. Menses regular, not heavy.   Endocrine Negative Cold intolerance and heat intolerance.   Neuro Negative Dizziness, extremity weakness, headache, memory impairment, numbness in extremities, seizures and tremors.   Psych Negative Anxiety, depression and insomnia.   Integumentary Negative Breast discharge, breast lump(s), hair loss and rash.   MS Negative Back pain and joint pain.   Hema/Lymph Negative Easy bleeding, easy bruising and thromboembolic events.   Allergic/Immuno Negative Environmental allergies, food allergies and seasonal allergies.         EXAM:   /69 (BP Location: Left arm, Patient Position: Sitting, Cuff Size: adult)   Pulse 68   Temp 97.8 °F (36.6 °C) (Temporal)   Ht 5' 10\" (1.778 m)   Wt 192 lb (87.1 kg)   LMP 10/16/2024 (Approximate)   SpO2 99%   BMI 27.55 kg/m²   Body mass index is 27.55 kg/m².     Constitutional Normal Well developed.   Eyes Normal Pupil - Right: Normal, Left: Normal.   Ears Normal External - normal. Canal. TM - Normal bilaterally  Hearing - grossly normal   Nasopharynx Normal External nose - Normal. Lips/teeth/gums - Normal. Oropharynx - clear no erythema or exudate   Neck Exam Normal Palpation - Normal. No thyromegaly or lymphadenopathy   Breast Normal She will follow up with ob   Respiratory Normal Auscultation - Normal, no wheezes or rales   Cardiovascular Normal Regular rate and rhythm. No murmurs, gallops, or rubs   Vascular Normal Pulses - Dorsalis pedis: Normal. Bruits - Carotids: Absent.    Extremity Normal No edema. No varicosities.   Abdomen Normal Inspection normal, BS active. No abdominal tenderness or masses palpable   Musculoskeletal Normal Overview - Normal. No swelling or deformities.   Skin Normal Inspection - Normal.   Neurological Normal Memory - Normal. Sensory -  Normal. Motor - Normal. Balance & gait - Normal.   Psychiatric Normal Orientation - Oriented to time, place, person & situation. Appropriate mood and affect.          ASSESSMENT AND PLAN:   Janice Lara is a 39 year old female who presents for a complete physical exam.  Self breast exam explained.   Health maintenance: Patient is due for blood work    Thyreomegaly -us thyroid   Abdominal bloating discussed about diet avoid gluten and dairy we will check H. pylori if symptoms persist for  Pt' s weight is Body mass index is 27.55 kg/m²., recommended low fat diet and aerobic exercise 30 minutes three times weekly.  The patient indicates understanding of these issues and agrees to the plan.  The patient is asked to return for annual physical in 1 year.

## 2024-10-23 LAB
EST. AVERAGE GLUCOSE BLD GHB EST-MCNC: 108 MG/DL (ref 68–126)
HBA1C MFR BLD: 5.4 % (ref ?–5.7)

## 2024-10-24 ENCOUNTER — TELEPHONE (OUTPATIENT)
Dept: INTERNAL MEDICINE CLINIC | Facility: CLINIC | Age: 39
End: 2024-10-24

## 2024-10-24 NOTE — TELEPHONE ENCOUNTER
Patient calling,verified name and date of birth.  Patient called to ask why a thyroid ultrasound  was ordered if her TSH is normal?  Advised  patient that Dr Renteria  ordered the ultrasound because  she noted an enlarged thyroid on exam at her recent office visit. TSH reflects the  thyroid function while ultrasound looks at the structure of the thyroid.  Patient verbalizes understanding and agrees to plan of care, will call to schedule ultrasound..

## 2024-10-29 ENCOUNTER — LAB ENCOUNTER (OUTPATIENT)
Dept: LAB | Age: 39
End: 2024-10-29
Attending: INTERNAL MEDICINE
Payer: COMMERCIAL

## 2024-10-29 ENCOUNTER — HOSPITAL ENCOUNTER (OUTPATIENT)
Dept: ULTRASOUND IMAGING | Age: 39
Discharge: HOME | End: 2024-10-29
Attending: INTERNAL MEDICINE
Payer: COMMERCIAL

## 2024-10-29 DIAGNOSIS — E01.0 THYROMEGALY: ICD-10-CM

## 2024-10-29 DIAGNOSIS — R14.0 ABDOMINAL BLOATING: ICD-10-CM

## 2024-10-29 PROCEDURE — 83013 H PYLORI (C-13) BREATH: CPT

## 2024-10-29 PROCEDURE — 76536 US EXAM OF HEAD AND NECK: CPT | Performed by: INTERNAL MEDICINE

## 2024-10-30 ENCOUNTER — TELEPHONE (OUTPATIENT)
Dept: INTERNAL MEDICINE CLINIC | Facility: CLINIC | Age: 39
End: 2024-10-30

## 2024-10-30 NOTE — TELEPHONE ENCOUNTER
Advised patient of Dr Renteria's note. Patient verbalized understanding. [facility]

## 2024-10-30 NOTE — TELEPHONE ENCOUNTER
Dr. Renteria - patient is nervous about thyroid US results, please advise    Spoke to patient, full name and date of birth verified.  Patient calling about thyroid US results, she would like to understand them better.   Patient is asking if she needs to have a biopsy or any other tests.    Ultrasound results not yet reviewed, informed patient all results are reviewed and we will call her back.     Patient stated she would also like to see endocrinology and gastroenterology, scheduling phone number provided to patient, she has PPO insurance, referral is not needed.

## 2024-10-30 NOTE — TELEPHONE ENCOUNTER
patient stated that she saw your result note but she does not understand your message. Patient is not understanding where it says \" There are borderline lymph nodes on the soft tissue on bilateral neck most likely a reactive if they are still persistent she will need to be seen\". What does this mean? What is the next step. Patient has no infection or inflammation. Patient stated that she came in for her Physical. Please advise.

## 2024-10-30 NOTE — TELEPHONE ENCOUNTER
Anamaria Aldrich CMA  10/30/2024  1:28 PM CDT       msg sent via [place].    Hermila Renteria MD  10/30/2024  1:04 PM CDT       Ultrasound of her thyroid shows mildly enlarged thyroid ,no focal lesions.  There are borderline lymph nodes on the soft tissue on bilateral neck most likely a reactive if they are still persistent she will need to be seen

## 2024-10-30 NOTE — TELEPHONE ENCOUNTER
Sometimes lymph nodes are  more prominent and most of the time they are reactive/could be any inflammation of the mouth/throat / tooth infection/.  She can come back in 2 weeks we can reexamine and make sure that she does not have any lymph nodes

## 2024-10-31 LAB — H PYLORI BREATH TEST: NEGATIVE

## 2024-11-15 ENCOUNTER — OFFICE VISIT (OUTPATIENT)
Dept: INTERNAL MEDICINE CLINIC | Facility: CLINIC | Age: 39
End: 2024-11-15

## 2024-11-15 VITALS
WEIGHT: 197 LBS | HEART RATE: 84 BPM | HEIGHT: 70 IN | TEMPERATURE: 98 F | DIASTOLIC BLOOD PRESSURE: 73 MMHG | SYSTOLIC BLOOD PRESSURE: 131 MMHG | OXYGEN SATURATION: 100 % | BODY MASS INDEX: 28.2 KG/M2

## 2024-11-15 DIAGNOSIS — E01.0 THYROMEGALY: Primary | ICD-10-CM

## 2024-11-15 PROCEDURE — 3008F BODY MASS INDEX DOCD: CPT | Performed by: INTERNAL MEDICINE

## 2024-11-15 PROCEDURE — 3078F DIAST BP <80 MM HG: CPT | Performed by: INTERNAL MEDICINE

## 2024-11-15 PROCEDURE — 99213 OFFICE O/P EST LOW 20 MIN: CPT | Performed by: INTERNAL MEDICINE

## 2024-11-15 PROCEDURE — 3075F SYST BP GE 130 - 139MM HG: CPT | Performed by: INTERNAL MEDICINE

## 2024-11-15 NOTE — PROGRESS NOTES
Subjective:     Patient ID: Janice Lara is a 39 year old female.    HPI    History/Other: Came in today for follow-up on her thyroid ultrasound.  She denies any complaints.  Review of Systems   Constitutional: Negative.    HENT: Negative.     Respiratory: Negative.     Cardiovascular: Negative.    Gastrointestinal: Negative.    Genitourinary: Negative.    Musculoskeletal: Negative.    Skin: Negative.    Neurological: Negative.    Hematological: Negative.    Psychiatric/Behavioral: Negative.       Current Outpatient Medications   Medication Sig Dispense Refill    Norethin Ace-Eth Estrad-FE (AUROVELA 24 FE) 1-20 MG-MCG(24) Oral Tab Take 1 tablet by mouth daily.      FLUoxetine HCl 40 MG Oral Cap Take 1 capsule (40 mg total) by mouth daily.      FLUoxetine HCl 10 MG Oral Cap Take 1 capsule (10 mg total) by mouth daily.       Allergies:Allergies[1]    Past Medical History:    Anxiety    Cervical dysplasia    Mild    Depression    Dysmenorrhea    Nervous disorder    Seasonal allergies    spring    Varicella      History reviewed. No pertinent surgical history.   Family History   Problem Relation Age of Onset    Other (liver) Father     No Known Problems Mother     Diabetes Maternal Grandmother     Diabetes Paternal Grandmother       Social History:   Social History     Socioeconomic History    Marital status:    Tobacco Use    Smoking status: Former     Types: Cigarettes    Smokeless tobacco: Never   Vaping Use    Vaping status: Former   Substance and Sexual Activity    Alcohol use: Not Currently    Drug use: Never    Sexual activity: Yes     Partners: Male     Social Drivers of Health     Financial Resource Strain: Low Risk  (6/20/2022)    [facility], [facility]    Overall Financial Resource Strain (CARDIA)     Difficulty of Paying Living Expenses: Not hard at all   Food Insecurity: No Food Insecurity (6/20/2022)    Received from [facility]    Hunger Vital Sign     Worried About Running Out of Food in the Last Year: Never true     Ran Out of Food in the Last Year: Never true   Transportation Needs: No Transportation Needs (6/20/2022)    Received from [facility]    PRAPARE - Transportation     Lack of Transportation (Medical): No     Lack of Transportation (Non-Medical): No   Physical Activity: Inactive (6/20/2022)    Received from [facility]    Exercise Vital Sign     Days of Exercise per Week: 0 days     Minutes of Exercise per Session: 0 min   Stress: No Stress Concern Present (6/20/2022)    Received from [facility]    [employer] [employer]ational [employer] - Occupational Stress Questionnaire     Feeling of Stress : Not at all   Social Connections: Moderately Isolated (6/20/2022)    Received from [facility]    Social Connection and Isolation Panel [NHANES]     Frequency of Communication with Friends and Family: Twice a week     Frequency of Social Gatherings with Friends and Family: Once a week     Attends Pentecostalism Services: Never     Active Member of Clubs or Organizations: No     Attends Club or Organization Meetings: Never     Marital Status:     [facility]        Objective:   Physical Exam  Vitals and nursing note reviewed.   Constitutional:       Appearance: Normal appearance.   HENT:      Head: Normocephalic and atraumatic.   Cardiovascular:      Rate and Rhythm: Normal rate and regular rhythm.      Pulses: Normal pulses.      Heart sounds: Normal heart sounds.   Pulmonary:      Effort: Pulmonary effort is normal.      Breath sounds: Normal breath sounds.   Musculoskeletal:      Cervical back: Normal range of motion and  neck supple.   Lymphadenopathy:      Head:      Right side of head: No submental, submandibular, tonsillar, preauricular, posterior auricular or occipital adenopathy.      Left side of head: No submental, submandibular, tonsillar, preauricular, posterior auricular or occipital adenopathy.      Cervical: No cervical adenopathy.      Right cervical: No superficial, deep or posterior cervical adenopathy.     Left cervical: No superficial, deep or posterior cervical adenopathy.   Skin:     General: Skin is warm and dry.   Neurological:      Mental Status: She is alert.         Assessment & Plan:   Thyromegaly-reviewed ultrasound with her we will repeat in 1 year.  Incidental findings more prominent lymph nodes in the neck exam is normal most likely reactive, resolved  No orders of the defined types were placed in this encounter.      Meds This Visit:  Requested Prescriptions      No prescriptions requested or ordered in this encounter       Imaging & Referrals:  None            [1]   Allergies  Allergen Reactions    Amoxicillin HIVES and RASH     Adverse reaction

## 2024-12-10 ENCOUNTER — HOSPITAL ENCOUNTER (OUTPATIENT)
Dept: GENERAL RADIOLOGY | Age: 39
Discharge: HOME | End: 2024-12-10
Attending: INTERNAL MEDICINE
Payer: COMMERCIAL

## 2024-12-10 ENCOUNTER — LAB ENCOUNTER (OUTPATIENT)
Dept: LAB | Age: 39
End: 2024-12-10
Attending: INTERNAL MEDICINE
Payer: COMMERCIAL

## 2024-12-10 ENCOUNTER — TELEPHONE (OUTPATIENT)
Dept: GASTROENTEROLOGY | Facility: CLINIC | Age: 39
End: 2024-12-10

## 2024-12-10 ENCOUNTER — OFFICE VISIT (OUTPATIENT)
Dept: GASTROENTEROLOGY | Facility: CLINIC | Age: 39
End: 2024-12-10

## 2024-12-10 VITALS
BODY MASS INDEX: 28.77 KG/M2 | DIASTOLIC BLOOD PRESSURE: 68 MMHG | HEIGHT: 70 IN | SYSTOLIC BLOOD PRESSURE: 111 MMHG | WEIGHT: 201 LBS | HEART RATE: 83 BPM

## 2024-12-10 DIAGNOSIS — R19.8 ALTERNATING CONSTIPATION AND DIARRHEA: ICD-10-CM

## 2024-12-10 DIAGNOSIS — R19.4 ALTERED BOWEL HABITS: ICD-10-CM

## 2024-12-10 DIAGNOSIS — R19.4 ALTERED BOWEL HABITS: Primary | ICD-10-CM

## 2024-12-10 DIAGNOSIS — R74.8 ELEVATED ALKALINE PHOSPHATASE LEVEL: ICD-10-CM

## 2024-12-10 DIAGNOSIS — R10.9 ABDOMINAL DISCOMFORT: ICD-10-CM

## 2024-12-10 DIAGNOSIS — R14.2 BELCHING: ICD-10-CM

## 2024-12-10 LAB
ALBUMIN SERPL-MCNC: 4.4 G/DL (ref 3.2–4.8)
ALP LIVER SERPL-CCNC: 110 U/L
ALT SERPL-CCNC: 19 U/L
AST SERPL-CCNC: 18 U/L (ref ?–34)
BILIRUB DIRECT SERPL-MCNC: 0.2 MG/DL (ref ?–0.3)
BILIRUB SERPL-MCNC: 0.7 MG/DL (ref 0.3–1.2)
IGA SERPL-MCNC: 270.4 MG/DL (ref 40–350)
PROT SERPL-MCNC: 7.1 G/DL (ref 5.7–8.2)

## 2024-12-10 PROCEDURE — 74018 RADEX ABDOMEN 1 VIEW: CPT | Performed by: INTERNAL MEDICINE

## 2024-12-10 PROCEDURE — 3008F BODY MASS INDEX DOCD: CPT | Performed by: INTERNAL MEDICINE

## 2024-12-10 PROCEDURE — 82784 ASSAY IGA/IGD/IGG/IGM EACH: CPT

## 2024-12-10 PROCEDURE — 99204 OFFICE O/P NEW MOD 45 MIN: CPT | Performed by: INTERNAL MEDICINE

## 2024-12-10 PROCEDURE — 80076 HEPATIC FUNCTION PANEL: CPT

## 2024-12-10 PROCEDURE — 3078F DIAST BP <80 MM HG: CPT | Performed by: INTERNAL MEDICINE

## 2024-12-10 PROCEDURE — 3074F SYST BP LT 130 MM HG: CPT | Performed by: INTERNAL MEDICINE

## 2024-12-10 PROCEDURE — 36415 COLL VENOUS BLD VENIPUNCTURE: CPT

## 2024-12-10 PROCEDURE — 86364 TISS TRNSGLTMNASE EA IG CLAS: CPT

## 2024-12-10 RX ORDER — PANTOPRAZOLE SODIUM 40 MG/1
40 TABLET, DELAYED RELEASE ORAL
Qty: 90 TABLET | Refills: 3 | Status: SHIPPED | OUTPATIENT
Start: 2024-12-10 | End: 2024-12-10

## 2024-12-10 RX ORDER — NORETHINDRONE ACETATE AND ETHINYL ESTRADIOL 1MG-20(21)
1 KIT ORAL DAILY
COMMUNITY
Start: 2024-11-26

## 2024-12-10 NOTE — H&P
[facility] - Gastroenterology                                                                                                               Reason for consult: altered bowel habits, belching, gas    Requesting physician or provider: LESLYE LACY DO    Chief Complaint   Patient presents with    Change of Bowel Habits     Gas and burping, upset stomach no matter what she eats, per patient       HPI:   Janice Lara is a 39 year old year-old female here for the following:     Pt has had GI issues on and off since high school. She saw a GI physician 10 years ago for similar symptoms.  Pt reports foul smelling flatulence and gas, belching, upset stomach after eating sometimes, and altered bowel habits. She denies abdominal pain.  Pt usually has a BM every day but reports intermittent incomplete evacuation, then bouts of diarrhea where she can have 5 loose Bms per day. She denies blood in the stool, f/c, unintentional weight loss, reflux, heartburn, dysphagia, or any other symptoms.     H. Pylori breath test was negative 10/31/24.   CBC and TSH wnl 10/2024.  CMP wnl besides mildly elevated alkaline phosphatase (116) 10/2024.     Denies family h/o CRC.     Prior endoscopies:  None.     Soc:  -denies smoking  -denies heavy Etoh  -no illicit drug use    Wt Readings from Last 6 Encounters:   12/10/24 201 lb (91.2 kg)   11/15/24 197 lb (89.4 kg)   10/22/24 192 lb (87.1 kg)   12/18/23 197 lb (89.4 kg)   08/03/23 194 lb (88 kg)   04/16/20 180 lb (81.6 kg)        History, Medications, Allergies, ROS:      Past Medical History:    Anxiety    Cervical dysplasia    Mild    Depression    Dysmenorrhea    Nervous disorder    Seasonal allergies    spring    Varicella      History reviewed. No pertinent surgical history.   Family Hx:   Family History   Problem Relation Age of Onset    Other (liver) Father     No Known Problems Mother      Diabetes Maternal Grandmother     Diabetes Paternal Grandmother       Social History:   Social History     Socioeconomic History    Marital status:    Tobacco Use    Smoking status: Former     Types: Cigarettes    Smokeless tobacco: Never   Vaping Use    Vaping status: Former   Substance and Sexual Activity    Alcohol use: Not Currently    Drug use: Never    Sexual activity: Yes     Partners: Male     Social Drivers of Health     Financial Resource Strain: Low Risk  (6/20/2022)    [facility]    Overall Financial Resource Strain (CARDIA)     Difficulty of Paying Living Expenses: Not hard at all   Food Insecurity: No Food Insecurity (6/20/2022)    [facility]    Hunger Vital Sign     Worried About Running Out of Food in the Last Year: Never true     Ran Out of Food in the Last Year: Never true   Transportation Needs: No Transportation Needs (6/20/2022)    [facility]    PRAPARE - Transportation     Lack of Transportation (Medical): No     Lack of Transportation (Non-Medical): No   Physical Activity: Inactive (6/20/2022)    [facility]    Exercise Vital Sign     Days of Exercise per Week: 0 days     Minutes of Exercise per Session: 0 min   Stress: No Stress Concern Present (6/20/2022)    [facility]    [facility] [employer] - Occupational Stress Questionnaire     Feeling of Stress : Not at all   Social Connections: Moderately Isolated (6/20/2022)    [facility] from [facility]    Social Connection and Isolation Panel [NHANES]     Frequency of Communication with Friends and Family: Twice a week      Frequency of Social Gatherings with Friends and Family: Once a week     Attends Christian Services: Never     Active Member of Clubs or Organizations: No     Attends Club or Organization Meetings: Never     Marital Status:     Received from [facility], [facility]        Medications (Active prior to today's visit):  Current Outpatient Medications   Medication Sig Dispense Refill    BLISOVI FE 1/20 1-20 MG-MCG Oral Tab Take 1 tablet by mouth daily.      polyethylene glycol, PEG 3350-KCl-NaBcb-NaCl-NaSulf, 236 g Oral Recon Soln Take 4,000 mL by mouth once for 1 dose. As directed by GI clinic instructions. 4000 mL 0    FLUoxetine HCl 40 MG Oral Cap Take 1 capsule (40 mg total) by mouth daily.      FLUoxetine HCl 10 MG Oral Cap Take 1 capsule (10 mg total) by mouth daily.      Norethin Ace-Eth Estrad-FE (AUROVELA 24 FE) 1-20 MG-MCG(24) Oral Tab Take 1 tablet by mouth daily. (Patient not taking: Reported on 12/10/2024)         Allergies:  Allergies[1]    ROS:   CONSTITUTIONAL:  negative for fevers, rigors  EYES:  negative for diplopia   RESPIRATORY:  negative for severe shortness of breath  CARDIOVASCULAR:  negative for crushing sub-sternal chest pain  GASTROINTESTINAL:  see HPI  GENITOURINARY:  negative for dysuria or gross hematuria  INTEGUMENT/BREAST:  SKIN:  negative for jaundice   ALLERGIC/IMMUNOLOGIC:  negative for hay fever  ENDOCRINE:  negative for cold intolerance and heat intolerance  MUSCULOSKELETAL:  negative for joint effusion/severe erythema  BEHAVIOR/PSYCH:  negative for psychotic behavior      PHYSICAL EXAM:   Blood pressure 111/68, pulse 83, height 5' 10\" (1.778 m), weight 201 lb (91.2 kg), last menstrual period 10/16/2024.    GEN - Patient appears comfortable and in no acute discomfort  ENT - MMM  EYES - the sclera appears anicteric  CV - no edema  RESP -  No increased work of breathing  ABDOMEN - soft, non-tender exam in all quadrants without rigidity or guarding,  non-distended, no abnormal bowel sounds noted, no masses are palpated  SKIN - No jaundice  NEURO - Alert and appropriate, and gross movements of extremities normal  PSYCH - normal affect, non-agitated      Labs/Imaging:     Patient's pertinent labs and imaging were reviewed and discussed with patient today.      .  ASSESSMENT/PLAN:     39 F here for the following:    Altered bowel habits  Alternating constipation and diarrhea  Abdominal discomfort - usually post prandial  Belching  Gas  Elevated alkaline phosphatase    Symptoms are chronic and pt saw a GI physician 10 years ago for similar symptoms.  Pt reports foul smelling flatulence and gas, belching, upset stomach after eating sometimes, altered bowel habits, and alternating diarrhea and constipation. Recent blood work is reassuring but will recheck LFTs given mildly elevated alk phos on the recent check.  H. Pylori was also negative. Recommend KUB to assess stool burden and screening for celiac disease. If the KUB shows a significant stool burden, will plan for a washout and bowel regimen. If not, will plan for empiric treatment for SIBO.  Will also plan for a PPI trial if symptoms continue given UGI symptoms. Calebkia, recommend a CLN given the persistence of her symptoms. Will plan for colon biopsies to r/o microscopic colitis.     Recommend    - LFTs, IgA, tTG IgA    - KUB    - Further recs to follow KUB    - CLN with MAC    - CLD the day prior, NPO after midnight, split dose golytely    Follow up in 1-2 months if symptoms continue.    Colonoscopy consent: I have discussed the risks, benefits, and alternatives to colonoscopy with the patient/primary decision maker [who demonstrated understanding], including but not limited to the risks of bleeding, infection, pain, death, as well as the risks of anesthesia and perforation all leading to prolonged hospitalization, surgical intervention, or even death. I also specifically mentioned the miss rate of colonoscopy  of 5-10% in the best of all circumstances. The patient has agreed to sign an informed consent and elected to proceed with colonoscopy with possible intervention [i.e. polypectomy, stent placement, etc.] as indicated.      Orders This Visit:  Orders Placed This Encounter   Procedures    Hepatic Function Panel (7)    Immunoglobulin A, Qn, Serum (IGA)    Tissue Transglutaminase Ab, IgA       Meds This Visit:  Requested Prescriptions     Signed Prescriptions Disp Refills    polyethylene glycol, PEG 3350-KCl-NaBcb-NaCl-NaSulf, 236 g Oral Recon Soln 4000 mL 0     Sig: Take 4,000 mL by mouth once for 1 dose. As directed by GI clinic instructions.       Imaging & Referrals:  None         Cassandra Curiel MD          This note was partially prepared using Dragon Medical voice recognition dictation software. As a result, errors may occur. When identified, these errors have been corrected. While every attempt is made to correct errors during dictation, discrepancies may still exist.          [1]   Allergies  Allergen Reactions    Amoxicillin HIVES and RASH     Adverse reaction    Adverse reaction      Adverse reaction Adverse reaction Adverse reaction Adverse reaction

## 2024-12-10 NOTE — TELEPHONE ENCOUNTER
Schedulers, see providers orders below:     -Patient was seen in office today and was provided with written and verbal procedure prep instructions, including any medication adjustments.   -Patient was advised to call medical insurance for any questions on benefits and/or any out of pocket costs.   -Patient is aware that the GI schedulers will be calling to schedule procedure(s) and that providers may not have any availability until possibly end of the year.           Instructions for the procedure  1. Schedule colonoscopy with [facility] [Diagnosis: altered bowel habits, alternating diarrhea and constipation]     2.  bowel prep from pharmacy (split dose golytely)     3. Continue all medications for procedure     4. Read all bowel prep instructions carefully     5. AVOID seeds, nuts, popcorn, raw fruits and vegetables (cooked is okay) for 2-3 days before procedure     6. If you start any NEW medication after your visit today, please notify us. Certain medications will need to be held before the procedure, or the procedure cannot be performed.

## 2024-12-10 NOTE — TELEPHONE ENCOUNTER
Scheduled for:  Colonoscopy 95[address]    Provider Name:  Dr Curiel    Date:  12/16/2024    Location:    [facility]    Sedation:  MAC    Time:  10:00 am (Patient made aware [facility] will call the day before with procedure/arrival time)    Prep:  golytely    Meds/Allergies Reconciled?:  Physician reviewed     Diagnosis with codes:    Altered bowel habits [R19.4]  Alternating constipation and diarrhea [R19.8]     Was patient informed to call insurance with codes (Y/N):  Yes, I confirmed [facility] insurance with the patient.     Referral sent?:  N/A    EM or [facility] notified?:  I sent an electronic request to Endo Scheduling and received a confirmation today.      Medication Orders:  This patient verbally confirmed that she is not taking:   Iron, blood thinners, BP meds, and is not diabetic   No latex allergy, PCN allergy and does not have a pacemaker     Misc Orders:       Further instructions given by staff:   I discussed the prep instructions with the patient which she verbally understood and is aware that I will send the instructions today via [employer].    Advised patient:    You will not be able to drive, operate machinery or make critical decisions the day of your procedure. Please make arrangements for transportation. You must have a  (age 18 or older) to accompany you, stay in the facility for the duration of your procedure and drive you home after the procedure.  You cannot use public transportation (Uber, Lyft, Taxi). The procedure involves sedation, and you will not be allowed to leave unaccompanied. Your procedure will not proceed forward if you're unable to confirm your  planned to escort you home.    Advised Patient:    [facility] requires payment of copay and any patient responsibility at the time of registration.   The [facility] requires copay and 50% of the patient responsibility at the time of service for all Esophagogastroduodenoscopy and diagnostic Colonoscopies.     They do offer payment plans and Care Credit options if  unable to pay the full amount at the time of registration.     If you have any questions regarding your potential responsibility, please contact [facility] Insurance Department at [phone] option 1.    You may receive 4 bills related to your medical procedure:   [facility] (the facility)  The procedural physician  The anesthesiologist  The pathology lab (if applicable)

## 2024-12-10 NOTE — PATIENT INSTRUCTIONS
PLAN    - Please have the blood work an x-ray done today   - Follow up with Dr. Curiel to discuss next steps (if she doesn't call you today or tomorrow)     - Follow up if any symptoms continue in the next 1-2 months    Instructions for the procedure  1. Schedule colonoscopy with [facility] [Diagnosis: altered bowel habits, alternating diarrhea and constipation]    2.  bowel prep from pharmacy (split dose golytely)    3. Continue all medications for procedure    4. Read all bowel prep instructions carefully    5. AVOID seeds, nuts, popcorn, raw fruits and vegetables (cooked is okay) for 2-3 days before procedure    6. If you start any NEW medication after your visit today, please notify us. Certain medications will need to be held before the procedure, or the procedure cannot be performed.

## 2024-12-11 ENCOUNTER — TELEPHONE (OUTPATIENT)
Dept: GASTROENTEROLOGY | Facility: CLINIC | Age: 39
End: 2024-12-11

## 2024-12-11 DIAGNOSIS — R74.8 ELEVATED ALKALINE PHOSPHATASE LEVEL: Primary | ICD-10-CM

## 2024-12-11 LAB — TTG IGA SER-ACNC: 0.3 U/ML (ref ?–7)

## 2024-12-11 NOTE — TELEPHONE ENCOUNTER
Hi, no - doing preps back to back would be too much.     I recommend just doing the split dose bowel prep before the colonoscopy AND bisacodyl 3 mg daily for three days before the scope along with the diet restrictions we typically recommend.     Thanks! SS

## 2024-12-11 NOTE — TELEPHONE ENCOUNTER
Dr. Curiel  Patient is scheduled for colonoscopy on Monday 12/16  See message below for patient and let me know your recommendations.    Thank you!    [facility]ice [facility] Clinical Staff (supporting Cassandra Curiel MD)2 hours ago (7:50 AM)     [facility]  Good morning Dr. Curiel,     They were able to get me in for a colonoscopy on December 16. Should I just begin the prep for that since it is coming up so soon? Or do I need to do the clean out twice?  Thank you!

## 2024-12-12 ENCOUNTER — TELEPHONE (OUTPATIENT)
Facility: CLINIC | Age: 39
End: 2024-12-12

## 2024-12-12 NOTE — TELEPHONE ENCOUNTER
----- Message from Cassandra Curiel sent at 12/11/2024 11:02 AM CST -----  [email or web address] message sent with results/recs.

## 2024-12-16 PROCEDURE — 88305 TISSUE EXAM BY PATHOLOGIST: CPT | Performed by: INTERNAL MEDICINE

## 2024-12-22 ENCOUNTER — LAB ENCOUNTER (OUTPATIENT)
Dept: LAB | Facility: HOSPITAL | Age: 39
End: 2024-12-22
Attending: INTERNAL MEDICINE
Payer: COMMERCIAL

## 2024-12-22 DIAGNOSIS — R74.8 ELEVATED ALKALINE PHOSPHATASE LEVEL: ICD-10-CM

## 2024-12-22 LAB
CERULOPLASMIN SERPL-MCNC: 48 MG/DL (ref 20–60)
DEPRECATED HBV CORE AB SER IA-ACNC: 30 NG/ML
HAV AB SER QL IA: REACTIVE
HAV IGM SER QL: NONREACTIVE
HBV CORE AB SERPL QL IA: NONREACTIVE
HBV SURFACE AB SER QL: REACTIVE
HBV SURFACE AB SERPL IA-ACNC: 13.35 MIU/ML
HBV SURFACE AG SER-ACNC: <0.1 [IU]/L
HBV SURFACE AG SERPL QL IA: NONREACTIVE
HCV AB SERPL QL IA: NONREACTIVE
IGA SERPL-MCNC: 268 MG/DL (ref 40–350)
IGM SERPL-MCNC: 76.9 MG/DL (ref 50–300)
IMMUNOGLOBULIN PNL SER-MCNC: 1111 MG/DL (ref 650–1600)
IRON SATN MFR SERPL: 16 %
IRON SERPL-MCNC: 67 UG/DL
TIBC SERPL-MCNC: 408 UG/DL (ref 250–425)
TRANSFERRIN SERPL-MCNC: 274 MG/DL (ref 250–380)

## 2024-12-22 PROCEDURE — 84466 ASSAY OF TRANSFERRIN: CPT

## 2024-12-22 PROCEDURE — 82728 ASSAY OF FERRITIN: CPT

## 2024-12-22 PROCEDURE — 82784 ASSAY IGA/IGD/IGG/IGM EACH: CPT

## 2024-12-22 PROCEDURE — 86708 HEPATITIS A ANTIBODY: CPT

## 2024-12-22 PROCEDURE — 82390 ASSAY OF CERULOPLASMIN: CPT

## 2024-12-22 PROCEDURE — 83516 IMMUNOASSAY NONANTIBODY: CPT

## 2024-12-22 PROCEDURE — 36415 COLL VENOUS BLD VENIPUNCTURE: CPT

## 2024-12-22 PROCEDURE — 87340 HEPATITIS B SURFACE AG IA: CPT

## 2024-12-22 PROCEDURE — 86709 HEPATITIS A IGM ANTIBODY: CPT

## 2024-12-22 PROCEDURE — 86706 HEP B SURFACE ANTIBODY: CPT

## 2024-12-22 PROCEDURE — 82103 ALPHA-1-ANTITRYPSIN TOTAL: CPT

## 2024-12-22 PROCEDURE — 83540 ASSAY OF IRON: CPT

## 2024-12-22 PROCEDURE — 86704 HEP B CORE ANTIBODY TOTAL: CPT

## 2024-12-22 PROCEDURE — 86803 HEPATITIS C AB TEST: CPT

## 2024-12-24 ENCOUNTER — TELEPHONE (OUTPATIENT)
Facility: CLINIC | Age: 39
End: 2024-12-24

## 2024-12-24 LAB
A-1-ANTITRYPSIN: 178 MG/DL
ACTIN SMOOTH MUSCLE AB: 4 UNITS
M2 MITOCHONDRIAL AB: <20 UNITS

## 2024-12-24 RX ORDER — ONDANSETRON 4 MG/1
4 TABLET, ORALLY DISINTEGRATING ORAL EVERY 8 HOURS PRN
Qty: 8 TABLET | Refills: 0 | Status: SHIPPED | OUTPATIENT
Start: 2024-12-24

## 2024-12-24 NOTE — TELEPHONE ENCOUNTER
Patient called to request prescription for zofran for nausea.  She also states she has diarrhea. Please call.

## 2024-12-24 NOTE — TELEPHONE ENCOUNTER
Tried calling multiples times but unfortunately unable to connect. Sounds like food poisoning or an acute gastroenteritis.  Zofran was sent in to her pharmacy.  She should let us know if symptoms don't improve significantly over the next 24-48 hours. Thanks, SS

## 2024-12-24 NOTE — TELEPHONE ENCOUNTER
Dr. Stephanie WAKEFIELD spoke to the patient    Patient states that she had a milkshake last night and woke up in the middle of the night with nausea/vomiting and diarrhea    Patient states she has been consistently nauseas since last night and vomited x1. She has went diarrhea approximately 10 times since last night.     Patient denies fever, abdominal pain, abdominal bloating, blood in stool    Patient wants to know if she can be prescribed zofran for the nausea    Please advise    Thank you

## 2024-12-26 ENCOUNTER — TELEPHONE (OUTPATIENT)
Facility: CLINIC | Age: 39
End: 2024-12-26

## 2024-12-26 NOTE — TELEPHONE ENCOUNTER
Dr. Curiel-    ASHU spoke to the patient    Patient states that she was able to  the Zofran prescription from the pharmacy    She states that her symptoms have improved and that she is feeling better    Patient states that she received your [employer] message for labs that were completed on 12/22/2024. Per patient she is scheduled to have liver ultrasound completed in January. Patient wondering if she should be concerned or if you have any thoughts/recommendations on Hepatitis A AB test result since it shows reactive    Please advise    Thank you

## 2024-12-26 NOTE — TELEPHONE ENCOUNTER
----- Message from Cassandra Curiel sent at 12/24/2024  1:31 PM CST -----  [email or web address] message sent with results/recs.    GI staff: please place recall for colonoscopy in 5 years

## 2024-12-26 NOTE — TELEPHONE ENCOUNTER
Recall colonoscopy in 5 years per Dr. Curiel    Colon done 12/16/2024    Health maintenance updated and message sent to patient outreach to repeat colonoscopy in 5 years

## 2025-01-21 ENCOUNTER — TELEPHONE (OUTPATIENT)
Facility: CLINIC | Age: 40
End: 2025-01-21

## 2025-01-21 NOTE — TELEPHONE ENCOUNTER
1st,overdue reminder letter mailed out to patient   Labs order    Alkaline Phosphatase Isoenzymes, Serum or Plasma (Order #[id number]) on 12/11/24

## 2025-01-25 ENCOUNTER — LAB ENCOUNTER (OUTPATIENT)
Dept: LAB | Age: 40
End: 2025-01-25
Attending: INTERNAL MEDICINE
Payer: COMMERCIAL

## 2025-01-25 ENCOUNTER — HOSPITAL ENCOUNTER (OUTPATIENT)
Dept: ULTRASOUND IMAGING | Age: 40
Discharge: HOME | End: 2025-01-25
Attending: INTERNAL MEDICINE
Payer: COMMERCIAL

## 2025-01-25 DIAGNOSIS — R74.8 ELEVATED ALKALINE PHOSPHATASE LEVEL: ICD-10-CM

## 2025-01-25 PROCEDURE — 84075 ASSAY ALKALINE PHOSPHATASE: CPT

## 2025-01-25 PROCEDURE — 76705 ECHO EXAM OF ABDOMEN: CPT | Performed by: INTERNAL MEDICINE

## 2025-01-25 PROCEDURE — 36415 COLL VENOUS BLD VENIPUNCTURE: CPT

## 2025-01-25 PROCEDURE — 84080 ASSAY ALKALINE PHOSPHATASES: CPT

## 2025-01-27 ENCOUNTER — HOSPITAL ENCOUNTER (OUTPATIENT)
Age: 40
Discharge: HOME | End: 2025-01-27
Payer: COMMERCIAL

## 2025-01-27 VITALS
RESPIRATION RATE: 18 BRPM | DIASTOLIC BLOOD PRESSURE: 65 MMHG | SYSTOLIC BLOOD PRESSURE: 133 MMHG | OXYGEN SATURATION: 98 % | TEMPERATURE: 99 F | HEART RATE: 92 BPM

## 2025-01-27 DIAGNOSIS — B34.9 VIRAL SYNDROME: Primary | ICD-10-CM

## 2025-01-27 LAB
POCT INFLUENZA A: NEGATIVE
POCT INFLUENZA B: NEGATIVE
S PYO AG THROAT QL: NEGATIVE
SARS-COV-2 RNA RESP QL NAA+PROBE: NOT DETECTED

## 2025-01-27 PROCEDURE — 87880 STREP A ASSAY W/OPTIC: CPT | Performed by: NURSE PRACTITIONER

## 2025-01-27 PROCEDURE — 87502 INFLUENZA DNA AMP PROBE: CPT | Performed by: NURSE PRACTITIONER

## 2025-01-27 PROCEDURE — 99213 OFFICE O/P EST LOW 20 MIN: CPT | Performed by: NURSE PRACTITIONER

## 2025-01-27 PROCEDURE — U0002 COVID-19 LAB TEST NON-CDC: HCPCS | Performed by: NURSE PRACTITIONER

## 2025-01-27 NOTE — ED PROVIDER NOTES
Patient Seen in: [facility]      History   No chief complaint on file.    Stated Complaint: Sore Throat    Subjective:   HPI      39-year-old female presents to the immediate care with fatigue, sore throat, body aches for the last 3 days.  Minimal cough no chest pain no shortness of breath.  No vomiting or diarrhea.    Objective:     Past Medical History:    Anxiety    Cervical dysplasia    Mild    Depression    Dysmenorrhea    Nervous disorder    Seasonal allergies    spring    Varicella              Past Surgical History:   Procedure Laterality Date    Colonoscopy N/A 12/16/2024    Procedure: COLONOSCOPY;  Surgeon: Cassandra Curiel MD;  Location: [facility] MAIN OR    [place] teeth removed                  Social History     Socioeconomic History    Marital status:    Tobacco Use    Smoking status: Former     Types: Cigarettes    Smokeless tobacco: Never   Vaping Use    Vaping status: Former   Substance and Sexual Activity    Alcohol use: Not Currently    Drug use: Never    Sexual activity: Yes     Partners: Male     Social Drivers of Health     Financial Resource Strain: Low Risk  (6/20/2022)    [facility]    Overall Financial Resource Strain (CARDIA)     Difficulty of Paying Living Expenses: Not hard at all   Food Insecurity: No Food Insecurity (6/20/2022)    [facility]    Hunger Vital Sign     Worried About Running Out of Food in the Last Year: Never true     Ran Out of Food in the Last Year: Never true   Transportation Needs: No Transportation Needs (6/20/2022)    Received from [facility], [facility]    PRAPARE - Transportation     Lack of Transportation (Medical): No     Lack of Transportation (Non-Medical): No   Physical Activity: Inactive (6/20/2022)    Received from [facility]    Exercise Vital Sign     Days of Exercise per Week: 0 days     Minutes of Exercise per Session: 0 min   Stress: No Stress Concern Present (6/20/2022)    [facility]    [employer] [employer] [place] [facility] [employer] [facility] - Occupational Stress Questionnaire     Feeling of Stress : Not at all   Social Connections: Moderately Isolated (6/20/2022)    [facility]    Social Connection and Isolation Panel [NHANES]     Frequency of Communication with Friends and Family: Twice a week     Frequency of Social Gatherings with Friends and Family: Once a week     Attends [employer] Services: Never     Active Member of Clubs or Organizations: No     Attends Club or Organization Meetings: Never     Marital Status:     [facility]    [facility]              Review of Systems    Positive for stated complaint: Sore Throat  Other systems are as noted in HPI.  Constitutional and vital signs reviewed.      All other systems reviewed and negative except as noted above.    Physical Exam     ED Triage Vitals [01/27/25 0916]   /65   Pulse 92   Resp 18   Temp 98.9 °F (37.2 °C)   Temp src Oral   SpO2 98 %   O2 Device None (Room air)       Current Vitals:   Vital Signs  BP: 133/65  Pulse: 92  Resp: 18  Temp: 98.9 °F (37.2 °C)  Temp src: Oral    Oxygen Therapy  SpO2: 98 %  O2 Device: None (Room air)        Physical Exam  Vitals and nursing note reviewed.   Constitutional:       Appearance: Normal appearance.   HENT:      Right Ear: Tympanic membrane normal.      Left Ear: Tympanic membrane normal.      Nose: Congestion present.      Mouth/Throat:      Pharynx: Posterior oropharyngeal erythema present.   Eyes:      Extraocular Movements: Extraocular movements intact.      Pupils: Pupils are equal, round, and reactive to light.   Cardiovascular:      Rate  and Rhythm: Normal rate and regular rhythm.      Pulses: Normal pulses.      Heart sounds: Normal heart sounds.   Pulmonary:      Effort: Pulmonary effort is normal.      Breath sounds: Normal breath sounds.   Musculoskeletal:      Cervical back: Normal range of motion.   Skin:     General: Skin is warm and dry.      Capillary Refill: Capillary refill takes less than 2 seconds.   Neurological:      Mental Status: She is alert and oriented to person, place, and time.             ED Course     Labs Reviewed   POCT RAPID STREP - Normal   POCT FLU TEST - Normal    Narrative:     This assay is a rapid molecular in vitro test utilizing nucleic acid amplification of influenza A and B viral RNA.   RAPID SARS-COV-2 BY PCR - Normal                   MDM              Medical Decision Making  Differentials include but not limited to influenza versus URI versus COVID versus pneumonia  Influenza neg  COVID neg  Strep neg  Supportive care: Run humidifier, increase fluids, elevate HOB, NSAIDs, Tylenol frequent nasal clearing, saline spray/steam showers. Educated on worsening signs and symptoms of illness.   Decongestants Mucinex/expectorant  Close PMD follow-up advised if sx persist  All vital signs are stable/sats appropriate on room air  Plan dc home to Follow-up with pmd/return to the immediate care for any new or worsening symptoms at any time            Problems Addressed:  Viral syndrome: acute illness or injury with systemic symptoms that poses a threat to life or bodily functions    Risk  OTC drugs.        Disposition and Plan     Clinical Impression:  1. Viral syndrome         Disposition:  Discharge  1/27/2025  9:38 am    Follow-up:  Hermila Renteria MD  [address] [place] [id number]  [phone]    Schedule an appointment as soon as possible for a visit   As needed          Medications Prescribed:  Current Discharge Medication List              Supplementary Documentation:

## 2025-01-29 LAB
ALK PHOSPHATASE: 119 IU/L
BONE FRAC: 21 %
INTESTINAL FRAC: 0 %
LIVER FRAC: 79 %

## 2025-01-30 ENCOUNTER — APPOINTMENT (OUTPATIENT)
Dept: GENERAL RADIOLOGY | Age: 40
End: 2025-01-30
Attending: NURSE PRACTITIONER
Payer: COMMERCIAL

## 2025-01-30 ENCOUNTER — HOSPITAL ENCOUNTER (OUTPATIENT)
Age: 40
Discharge: HOME | End: 2025-01-30
Payer: COMMERCIAL

## 2025-01-30 VITALS
OXYGEN SATURATION: 99 % | TEMPERATURE: 98 F | HEART RATE: 76 BPM | SYSTOLIC BLOOD PRESSURE: 128 MMHG | RESPIRATION RATE: 20 BRPM | DIASTOLIC BLOOD PRESSURE: 76 MMHG

## 2025-01-30 DIAGNOSIS — J18.9 COMMUNITY ACQUIRED PNEUMONIA OF RIGHT LUNG, UNSPECIFIED PART OF LUNG: ICD-10-CM

## 2025-01-30 DIAGNOSIS — R05.1 ACUTE COUGH: Primary | ICD-10-CM

## 2025-01-30 LAB
#MXD IC: 0.6 X10ˆ3/UL (ref 0.1–1)
ATRIAL RATE: 76 BPM
B-HCG UR QL: NEGATIVE
BUN BLD-MCNC: 6 MG/DL (ref 7–18)
CHLORIDE BLD-SCNC: 102 MMOL/L (ref 98–112)
CO2 BLD-SCNC: 24 MMOL/L (ref 21–32)
CREAT BLD-MCNC: 0.7 MG/DL
DDIMER WHOLE BLOOD: 275 NG/ML DDU (ref ?–400)
EGFRCR SERPLBLD CKD-EPI 2021: 114 ML/MIN/1.73M2 (ref 60–?)
GLUCOSE BLD-MCNC: 65 MG/DL (ref 70–99)
HCT VFR BLD AUTO: 37.1 %
HCT VFR BLD CALC: 40 %
HGB BLD-MCNC: 12.2 G/DL
ISTAT IONIZED CALCIUM FOR CHEM 8: 1.19 MMOL/L (ref 1.12–1.32)
LYMPHOCYTES # BLD AUTO: 1.6 X10ˆ3/UL (ref 1–4)
LYMPHOCYTES NFR BLD AUTO: 24.3 %
MCH RBC QN AUTO: 28.6 PG (ref 26–34)
MCHC RBC AUTO-ENTMCNC: 32.9 G/DL (ref 31–37)
MCV RBC AUTO: 87.1 FL (ref 80–100)
MIXED CELL %: 9.2 %
NEUTROPHILS # BLD AUTO: 4.3 X10ˆ3/UL (ref 1.5–7.7)
NEUTROPHILS NFR BLD AUTO: 66.5 %
P AXIS: 39 DEGREES
P-R INTERVAL: 166 MS
PLATELET # BLD AUTO: 356 X10ˆ3/UL (ref 150–450)
POTASSIUM BLD-SCNC: 3.7 MMOL/L (ref 3.6–5.1)
Q-T INTERVAL: 380 MS
QRS DURATION: 98 MS
QTC CALCULATION (BEZET): 427 MS
R AXIS: 45 DEGREES
RBC # BLD AUTO: 4.26 X10ˆ6/UL
SODIUM BLD-SCNC: 138 MMOL/L (ref 136–145)
T AXIS: 29 DEGREES
TROPONIN I BLD-MCNC: <0.02 NG/ML
VENTRICULAR RATE: 76 BPM
WBC # BLD AUTO: 6.5 X10ˆ3/UL (ref 4–11)

## 2025-01-30 PROCEDURE — 71046 X-RAY EXAM CHEST 2 VIEWS: CPT | Performed by: NURSE PRACTITIONER

## 2025-01-30 PROCEDURE — 80047 BASIC METABLC PNL IONIZED CA: CPT | Performed by: NURSE PRACTITIONER

## 2025-01-30 PROCEDURE — 99214 OFFICE O/P EST MOD 30 MIN: CPT | Performed by: NURSE PRACTITIONER

## 2025-01-30 PROCEDURE — 84484 ASSAY OF TROPONIN QUANT: CPT | Performed by: NURSE PRACTITIONER

## 2025-01-30 PROCEDURE — 81025 URINE PREGNANCY TEST: CPT | Performed by: NURSE PRACTITIONER

## 2025-01-30 PROCEDURE — 93000 ELECTROCARDIOGRAM COMPLETE: CPT | Performed by: NURSE PRACTITIONER

## 2025-01-30 PROCEDURE — 85025 COMPLETE CBC W/AUTO DIFF WBC: CPT | Performed by: NURSE PRACTITIONER

## 2025-01-30 RX ORDER — AZITHROMYCIN 250 MG/1
TABLET, FILM COATED ORAL
Qty: 6 TABLET | Refills: 0 | Status: SHIPPED | OUTPATIENT
Start: 2025-01-30 | End: 2025-02-04

## 2025-01-30 RX ORDER — CEFPODOXIME PROXETIL 200 MG/1
200 TABLET, FILM COATED ORAL 2 TIMES DAILY
Qty: 14 TABLET | Refills: 0 | Status: SHIPPED | OUTPATIENT
Start: 2025-01-30 | End: 2025-02-06

## 2025-01-30 RX ORDER — BENZONATATE 100 MG/1
100 CAPSULE ORAL 3 TIMES DAILY PRN
Qty: 30 CAPSULE | Refills: 0 | Status: SHIPPED | OUTPATIENT
Start: 2025-01-30 | End: 2025-03-01

## 2025-01-30 RX ORDER — ALBUTEROL SULFATE 90 UG/1
2 INHALANT RESPIRATORY (INHALATION) EVERY 4 HOURS PRN
Qty: 1 EACH | Refills: 0 | Status: SHIPPED | OUTPATIENT
Start: 2025-01-30 | End: 2025-03-01

## 2025-01-30 NOTE — ED PROVIDER NOTES
Patient Seen in: [facility]      History     Chief Complaint   Patient presents with    Cough    Shortness Of Breath     Stated Complaint: Sob; Cough    Subjective:   HPI    39-year-old female here for evaluation of cough, fatigue, sore throat that has been ongoing for 1 week.  She was here on the 27th and tested negative for strep COVID and flu.  She reports her fever and bodyaches has resolved but she is having worsening cough, shortness of breath even at rest and chest tightness with cough and deep breathing.  She reports it hurt to breathe yesterday.  She has not been doing much exertional activities and still feels this way.  She denies abdominal pain, vomiting or diarrhea.  She does have some intermittent lightheadedness and fogginess for the last 2 days.  Patient is on OCP, recent travel to [place] by plane.  Denies recent surgery, and is not a current smoker.    Objective:     Past Medical History:    Anxiety    Cervical dysplasia    Mild    Depression    Dysmenorrhea    Nervous disorder    Seasonal allergies    spring    Varicella              Past Surgical History:   Procedure Laterality Date    Colonoscopy N/A 12/16/2024    Procedure: COLONOSCOPY;  Surgeon: Cassandra Curiel MD;  Location: [facility] MAIN OR    [place] teeth removed                  Social History     Socioeconomic History    Marital status:    Tobacco Use    Smoking status: Former     Types: Cigarettes    Smokeless tobacco: Never   Vaping Use    Vaping status: Former   Substance and Sexual Activity    Alcohol use: Not Currently    Drug use: Never    Sexual activity: Yes     Partners: Male     Social Drivers of Health     Financial Resource Strain: Low Risk  (6/20/2022)    [facility], [facility]    Overall Financial Resource Strain (CARDIA)     Difficulty of Paying Living Expenses: Not hard at all   Food Insecurity: No Food Insecurity (6/20/2022)    Received from [place]  [facility], [facility]    Hunger Vital Sign     Worried About Running Out of Food in the Last Year: Never true     Ran Out of Food in the Last Year: Never true   Transportation Needs: No Transportation Needs (6/20/2022)    Received from [facility]    PRAPARE - Transportation     Lack of Transportation (Medical): No     Lack of Transportation (Non-Medical): No   Physical Activity: Inactive (6/20/2022)    Received from [facility]    Exercise Vital Sign     Days of Exercise per Week: 0 days     Minutes of Exercise per Session: 0 min   Stress: No Stress Concern Present (6/20/2022)    Received from [facility]    [place] [employer] - Occupational Stress Questionnaire     Feeling of Stress : Not at all   Social Connections: Moderately Isolated (6/20/2022)    Received from [facility]    Social Connection and Isolation Panel [NHANES]     Frequency of Communication with Friends and Family: Twice a week     Frequency of Social Gatherings with Friends and Family: Once a week     Attends Yazdanism Services: Never     Active Member of Clubs or Organizations: No     Attends Club or Organization Meetings: Never     Marital Status:     Received from [employer], [employer]    [facility]              Review of Systems    Positive for stated complaint: Sob; Cough  Other systems are as noted in HPI.  Constitutional and vital signs reviewed.      All other systems reviewed and negative except as noted above.    Physical Exam     ED Triage Vitals [01/30/25 0809]   /72   Pulse 85   Resp 24   Temp 98.2 °F (36.8 °C)   Temp src Oral   SpO2 100 %   O2 Device None (Room air)       Current Vitals:   Vital Signs  BP: 128/76  Pulse: 76  Resp: 20  Temp:  98.2 °F (36.8 °C)  Temp src: Oral    Oxygen Therapy  SpO2: 99 %  O2 Device: None (Room air)        Physical Exam  Vitals and nursing note reviewed.   Constitutional:       General: She is not in acute distress.     Appearance: She is well-developed. She is ill-appearing (fatigued). She is not toxic-appearing or diaphoretic.   HENT:      Head: Normocephalic and atraumatic.      Right Ear: Tympanic membrane, ear canal and external ear normal.      Left Ear: Tympanic membrane, ear canal and external ear normal.      Nose: Nose normal.      Mouth/Throat:      Mouth: Mucous membranes are moist.      Pharynx: Oropharynx is clear. No pharyngeal swelling or oropharyngeal exudate.   Eyes:      General:         Right eye: No discharge.         Left eye: No discharge.      Pupils: Pupils are equal, round, and reactive to light.   Neck:      Vascular: No JVD.   Cardiovascular:      Rate and Rhythm: Normal rate and regular rhythm.   Pulmonary:      Effort: No tachypnea, bradypnea or respiratory distress.      Breath sounds: Normal breath sounds. No stridor. No decreased breath sounds, wheezing, rhonchi or rales.   Chest:      Chest wall: No tenderness.   Musculoskeletal:      Cervical back: Normal range of motion and neck supple.   Lymphadenopathy:      Cervical: No cervical adenopathy.   Skin:     General: Skin is warm.      Findings: No rash.   Neurological:      General: No focal deficit present.      Mental Status: She is alert.      Motor: No weakness.   Psychiatric:         Mood and Affect: Mood normal.         Behavior: Behavior normal.             ED Course     Labs Reviewed   POCT ISTAT CHEM8 CARTRIDGE - Abnormal; Notable for the following components:       Result Value    ISTAT BUN 6 (*)     ISTAT Glucose 65 (*)     All other components within normal limits   D-DIMER (POC) - Normal   POCT PREGNANCY URINE - Normal   ISTAT TROPONIN - Normal   POCT CBC       ED Course as of 01/30/25  0955  ------------------------------------------------------------  Time: 01/30 0841  Value: EKG 12 Lead  Comment: Normal sinus rhythm  Incomplete right bundle branch block  Borderline ECG  No previous ECGs found in [facility]      ------------------------------------------------------------  Time: 01/30 0856  Value: POCT CBC  Comment: (Reviewed)  ------------------------------------------------------------  Time: 01/30 0856  Value: POCT Pregnancy, Urine  Comment: (Reviewed)  ------------------------------------------------------------  Time: 01/30 0914  Value: ISTAT BUN(!): 6  Comment: (Reviewed)  ------------------------------------------------------------  Time: 01/30 0914  Value: ISTAT GLUCOSE(!): 65  Comment: (Reviewed)  ------------------------------------------------------------  Time: 01/30 0914  Value: ISTAT Troponin  Comment: (Reviewed)  ------------------------------------------------------------  Time: 01/30 0914  Value: D-Dimer,Triage  Comment: (Reviewed)  ------------------------------------------------------------  Time: 01/30 0933  Value: XR CHEST PA + LAT CHEST (MMG=01867)  Comment: Findings and impression:      Mild-to-moderate ill-defined nonspecific opacities have developed throughout the right mid lung likely within the superior segment of the right lower lobe.      Normal heart size.  Pectus excavatum.      No pneumothorax or pleural effusion               MDM     39-year-old female here for evaluation of cough fatigue sore throat this been ongoing for a week patient reports worsening exertional and at rest shortness of breath, chest tightness and pain especially with deep breathing.  Patient reports OCP use and recent travel.      Exam patient fatigued appearing, lungs are clear with no wheezing stridor or crackles, good air movement throughout, when taking deep breaths for exam patient has induced bronchospastic cough .100% on room air with no tachypnea.  Bilateral TM normal, pharynx clear with no  erythema or swelling.  Tongue is moist    Differential diagnoses reflecting the complexity of care include but are not limited to: PE, ACS, pneumonia, viral syndrome  Comorbidities that add complexity to management include: Anxiety, OCP use  History obtained by an independent source was from: Patient  My independent interpretations of studies include: EKG normal sinus rhythm with incomplete right bundle branch block> reviewed with Dr. Atkinson.    Shared decision making was done by: patient, myself  Discussions of management was done with: Dr Atkinson, attending at [place] today.    PT fatigued, non-toxic and in no acute distress.  Vital signs are stable.     CBC unremarkable  UCG neg  Chem, glucose 65, Gatorade given.  Otherwise unremarkable  Trop= negative  Dimer= negative    Discussed neg dimer so will do CXR.  Chest x-ray showing mild to moderate ill-defined nonspecific opacities in the mid right midlung and segment of the right lower lobe.  Will treat this as pneumonia today due to symptoms.    Patient has allergy to amoxicillin, reaction was hives.  Discussed cefpodoxime x 7 days.  Patient agrees with this.  Azithromycin x 5 days sent for atypical bacteria coverage  Tessalon Perles and albuterol inhaler for cough bronchospasm and shortness of breath.    Discussed infection prevention at home, work.  Work note given.  Advised on p.o. fluids, rest, Tylenol Motrin if needed, salt water gargles for sore throat, bland diet and rest.    All questions answered. Return and ER precautions given.    Counseled: Patient (and guardian if applicable), regarding diagnosis, regarding treatment plan, regarding diagnostic results, regarding prescription, I have discussed with the patient the results of tests, differential diagnosis, and warning signs and symptoms that should prompt immediate return or ER visit. The patient understands these instructions and agrees to the follow-up plan provided. There is no barriers to  learning. Appropriate f/u given. Patient agrees to seek medical attention for any concerns/problems/complications.          Medical Decision Making      Disposition and Plan     Clinical Impression:  1. Acute cough    2. Community acquired pneumonia of right lung, unspecified part of lung         Disposition:  Discharge  1/30/2025  9:42 am    Follow-up:  Hermila Renteria MD  [address] [place] [id number]  [phone]    In 2 weeks            Medications Prescribed:  Discharge Medication List as of 1/30/2025  9:43 AM        START taking these medications    Details   benzonatate 100 MG Oral Cap Take 1 capsule (100 mg total) by mouth 3 (three) times daily as needed for cough., Normal, Disp-30 capsule, R-0      albuterol 108 (90 Base) MCG/ACT Inhalation Aero Soln Inhale 2 puffs into the lungs every 4 (four) hours as needed for Wheezing or Shortness of Breath., Normal, Disp-1 each, R-0      cefpodoxime 200 MG Oral Tab Take 1 tablet (200 mg total) by mouth 2 (two) times daily for 7 days., Normal, Disp-14 tablet, R-0      azithromycin (ZITHROMAX Z-KARTIK) 250 MG Oral Tab 500 mg once followed by 250 mg daily x 4 days, Normal, Disp-6 tablet, R-0                 Supplementary Documentation:

## 2025-01-30 NOTE — DISCHARGE INSTRUCTIONS
Follow-up with your primary care provider in 10 days to make sure there is resolution.  Call today for an appointment.    Take Tessalon Perles 3 times a day for cough  Use albuterol inhaler 2 puffs every 4-6 hours for cough shortness of breath and bronchospasms, use spacer to help get medications in.    Take Cefpodoxime two times a day for 7 days.  Take azithromycin as directed for 5 days    Increase water intake, humidifier at bedside, warm teas with honey, throat lozenges, cough suppressant drops.    Tylenol or Motrin as needed for pain body aches fever chills greater than 100.4 Fahrenheit    Return or go to ER for worsening shortness of breath despite therapy, chest pain, dizziness, fever greater than 101 despite medication use.

## 2025-01-30 NOTE — ED INITIAL ASSESSMENT (HPI)
Pt c/o runny nose, cough, fatigue x1 week, worse x3 days with SOB and chest pain with coughing and deep breathing.  Seen at IC 1/27/25 neg covid, flu and strep.  States fever initially better since.

## 2025-02-04 ENCOUNTER — TELEPHONE (OUTPATIENT)
Facility: CLINIC | Age: 40
End: 2025-02-04

## 2025-02-04 NOTE — TELEPHONE ENCOUNTER
GI staff - the only x-ray I see from 1/30 is a chest x-ray.  I recommend discussing this with her PCP.  But if she meant the several tests I ordered previously, I have summarized all the findings below:    The abdominal x-ray was reviewed with her ~12/11/24, which showed a large stool burden. This should have been cleared out with the bowel prep for her colonoscopy on 12/16.  Her rectal polyp was benign and I had recommended 5 year recall after the scope for surveillance purposes. For constipation, taking Benefiber or Metamucil 2 scoops daily will be helpful to keep the stools soft and regular.     Re: her other work up (she had an elevated alk-phos), I had sent a message about her normal chronic liver disease blood work, which is great. The ultrasound of the liver she had done recently shows diffuse hepatic steatosis, or fatty infiltration of the liver.  There is nothing else notable on the ultrasound.      Since her last alk-phos on 1/25/25 was normal, I recommend monitoring the liver numbers every 6-12 months and weight reduction efforts since weight reduction of even 5-10% has been shown to reverse fat in the liver.

## 2025-02-04 NOTE — TELEPHONE ENCOUNTER
Dr. Curiel-    Patient requesting to go over labs/xray results    Please provide result note and I can review this with the patient    Thank you

## 2025-02-05 NOTE — TELEPHONE ENCOUNTER
Spoke with patient verified name and date of birth. Went over test results and recommendations. Patient was referring to US of liver- pt agreeable with plan instructed will need to monitor alk-adamaris in 6-12 months and work on weight reduction to help with fatty liver. Patient also said she switched from metamucil to miralax because metamucil was hurting her stomach.Wanted to make sure Dr. Curiel is okay with her staying on that ?     Patient requested copy of results be sent to her [employer] message with results sent.

## 2025-02-11 ENCOUNTER — APPOINTMENT (OUTPATIENT)
Dept: GENERAL RADIOLOGY | Age: 40
End: 2025-02-11
Attending: NURSE PRACTITIONER
Payer: COMMERCIAL

## 2025-02-11 ENCOUNTER — HOSPITAL ENCOUNTER (OUTPATIENT)
Age: 40
Discharge: HOME | End: 2025-02-11
Payer: COMMERCIAL

## 2025-02-11 VITALS
HEART RATE: 74 BPM | SYSTOLIC BLOOD PRESSURE: 124 MMHG | RESPIRATION RATE: 20 BRPM | DIASTOLIC BLOOD PRESSURE: 69 MMHG | OXYGEN SATURATION: 98 % | TEMPERATURE: 99 F

## 2025-02-11 DIAGNOSIS — M94.0 COSTOCHONDRITIS, ACUTE: ICD-10-CM

## 2025-02-11 DIAGNOSIS — R09.1 PLEURISY WITHOUT EFFUSION: ICD-10-CM

## 2025-02-11 DIAGNOSIS — R05.1 ACUTE COUGH: Primary | ICD-10-CM

## 2025-02-11 PROCEDURE — 71101 X-RAY EXAM UNILAT RIBS/CHEST: CPT | Performed by: NURSE PRACTITIONER

## 2025-02-11 PROCEDURE — 99213 OFFICE O/P EST LOW 20 MIN: CPT | Performed by: NURSE PRACTITIONER

## 2025-02-11 RX ORDER — METHYLPREDNISOLONE 4 MG/1
TABLET ORAL
Qty: 1 EACH | Refills: 0 | Status: SHIPPED | OUTPATIENT
Start: 2025-02-11

## 2025-02-11 RX ORDER — CYCLOBENZAPRINE HCL 10 MG
10 TABLET ORAL 3 TIMES DAILY PRN
Qty: 20 TABLET | Refills: 0 | Status: SHIPPED | OUTPATIENT
Start: 2025-02-11 | End: 2025-02-18

## 2025-02-11 NOTE — DISCHARGE INSTRUCTIONS
Lidocaine is the active ingredient in pain patches that may help your upper back, side and rib pain feel better.     You should do the following:  ?Take all your medicines as instructed.  You can take non-prescription medicines to relieve pain, such as acetaminophen (sample brand name: Tylenol), ibuprofen (sample brand names: Advil, Motrin), or naproxen (sample brand name: Aleve).  ?Take 10 to 15 slow deep breaths at least 4 times each day. Your doctor might give you a device called an \"incentive spirometer\" to help you do this. If so, follow the instructions for using it. This helps keep your lungs fully open, and might lower your chances of getting a lung infection.  ?If you have discomfort when you cough or sneeze, hold a pillow to your chest to ease the pain.  ?Lie on the side that hurts. This can make breathing more comfortable.  ?Quit smoking, if you smoke. Your doctor or nurse can help.  ?If your pain is caused by a muscle strain or similar injury, ice can help ease the pain:  Put a cold gel pack, bag of ice, or bag of frozen vegetables on the injured area every 1 to 2 hours, for 15 minutes each time. Put a thin towel between the ice (or other cold object) and the skin. Use for the first 24 to 48 hours.

## 2025-02-11 NOTE — ED INITIAL ASSESSMENT (HPI)
Pt c/o cough x2.5 weeks, R sided chest pain that started when coughing, worse since last night.  Seen at IC 1/30/25 and dx'd with pneumonia.

## 2025-02-11 NOTE — ED PROVIDER NOTES
Patient Seen in: [facility]      History     Chief Complaint   Patient presents with    Chest Pain    Cough     Stated Complaint: Chest Pain, Cough    Subjective:   HPI      38yo female represents to IC c/o R sided upper back, rib/side pain. Was treated for PNA 1/30/2025. Completed antibiotics. Using albuterol inhaler daily. Did not find much benefit from tessalon pearles.   States has been taking tylenol, ibuprofen w/minimal relief. Is concerned that she may have a broken rib on her right side. No fever/chills.       Objective:     Past Medical History:    Anxiety    Cervical dysplasia    Mild    Depression    Dysmenorrhea    Nervous disorder    Seasonal allergies    spring    Varicella              Past Surgical History:   Procedure Laterality Date    Colonoscopy N/A 12/16/2024    Procedure: COLONOSCOPY;  Surgeon: Cassandra Curiel MD;  Location: [facility] MAIN OR    [place] teeth removed                  Social History     Socioeconomic History    Marital status:    Tobacco Use    Smoking status: Former     Types: Cigarettes    Smokeless tobacco: Never   Vaping Use    Vaping status: Former   Substance and Sexual Activity    Alcohol use: Not Currently    Drug use: Never    Sexual activity: Yes     Partners: Male     Social Drivers of Health     Food Insecurity: No Food Insecurity (6/20/2022)    [facility]    Hunger Vital Sign     Worried About Running Out of Food in the Last Year: Never true     Ran Out of Food in the Last Year: Never true   Transportation Needs: No Transportation Needs (6/20/2022)    Received from [facility], [facility]    PRAPARE - Transportation     Lack of Transportation (Medical): No     Lack of Transportation (Non-Medical): No    Received from [facility]              Review of Systems    Positive for stated complaint: Chest Pain,  Cough  Other systems are as noted in HPI.  Constitutional and vital signs reviewed.      All other systems reviewed and negative except as noted above.    Physical Exam     ED Triage Vitals [02/11/25 1004]   /69   Pulse 74   Resp 20   Temp 98.6 °F (37 °C)   Temp src Oral   SpO2 98 %   O2 Device None (Room air)       Current Vitals:   Vital Signs  BP: 124/69  Pulse: 74  Resp: 20  Temp: 98.6 °F (37 °C)  Temp src: Oral    Oxygen Therapy  SpO2: 98 %  O2 Device: None (Room air)        Physical Exam  Vitals and nursing note reviewed.   Constitutional:       General: She is not in acute distress.     Appearance: She is not toxic-appearing.   HENT:      Head: Normocephalic.      Nose: Nose normal. No congestion or rhinorrhea.      Mouth/Throat:      Mouth: Mucous membranes are moist.   Pulmonary:      Effort: Pulmonary effort is normal. No accessory muscle usage or respiratory distress.      Breath sounds: No stridor. No decreased breath sounds, wheezing, rhonchi or rales.   Abdominal:      General: Abdomen is flat.      Palpations: Abdomen is soft.   Musculoskeletal:         General: Normal range of motion.        Arms:       Cervical back: Normal range of motion.   Skin:     General: Skin is warm and dry.      Capillary Refill: Capillary refill takes less than 2 seconds.   Neurological:      General: No focal deficit present.      Mental Status: She is alert.             ED Course   Labs Reviewed - No data to display                MDM            Medical Decision Making  38yo female p/w R sided chest pain, recently treated for PNA. Concern for resolving PNA, resulting pleurisy, pulmonary tolieting. Perc and wells negative.     Xray of chest >I have directly viewed this exam,  No pna as clinically questioned. Pending rad read.     Xray>XR RIBS WITH CHEST (3 VIEWS), RIGHT  (CPT=71101)    Result Date: 2/11/2025  CONCLUSION:  1. No acute right rib fracture. 2. Patchy right lung airspace disease on recent prior January,  2025 chest radiographs has significantly improved.  Findings are most compatible with resolving pneumonia.   elm-remote  Dictated by (CST): Kelvin Shane MD on 2/11/2025 at 11:19 AM     Finalized by (CST): Kelvin Shane MD on 2/11/2025 at 11:22 AM            Chest pain reasons considered-  PE-perc negative, no sob, vss  PNA-no infectious findings, normal xray  esophageal rupture-swallows normal  ACS-no clinical concern, recently had negative work up less than 10 days ago.   Pneumothroax-chest xray is normal, repeated today  Aortic dissection not likely, no abdominal pain, BP is normal    Most likely pleurisy, chest wall pain, costochondritis. Discussed bracing side when coughing, using pain medications flexiril, lidocaine patches. Also sent rx for medrol dose pack.   May continue albuterol inhaler if she feels this is helping her cough.     Physical exam remained stable over serial reexaminations as previously documented. External chart review completed. No recent hospitalizations for the same.      I have discussed with the patient the results of tests, differential diagnosis, and warning signs and symptoms that should prompt immediate return.  The patient understands these instructions and agrees to the follow-up plan provided.  There are no barriers to learning.   Appropriate f/u given.  Patient agrees to return for any concerns/problems/complications.    Differential diagnosis reflecting the complexity of care include: see above    Comorbidities that add complexity to management include:na    External chart review was done and was noted:Yes    History obtained by an independent source was from: Patient    Discussions of management was done with:Patient    My independent interpretation of studies of:Xray    Diagnostic tests and medications considered but not ordered were:na    Social determinants of health that affect care:NA    Shared decision making was done by Self, Patient            Disposition and Plan      Clinical Impression:  1. Acute cough    2. Pleurisy without effusion    3. Costochondritis, acute         Disposition:  Discharge  2/11/2025 11:35 am    Follow-up:  No follow-up provider specified.        Medications Prescribed:  Discharge Medication List as of 2/11/2025 11:50 AM        START taking these medications    Details   cyclobenzaprine 10 MG Oral Tab Take 1 tablet (10 mg total) by mouth 3 (three) times daily as needed for Muscle spasms., Normal, Disp-20 tablet, R-0      methylPREDNISolone (MEDROL) 4 MG Oral Tablet Therapy Pack Dosepack: take as directed, Normal, Disp-1 each, R-0                 Supplementary Documentation:

## 2025-03-19 ENCOUNTER — HOSPITAL ENCOUNTER (OUTPATIENT)
Age: 40
Discharge: HOME | End: 2025-03-19
Payer: COMMERCIAL

## 2025-03-19 ENCOUNTER — APPOINTMENT (OUTPATIENT)
Dept: GENERAL RADIOLOGY | Age: 40
End: 2025-03-19
Attending: NURSE PRACTITIONER
Payer: COMMERCIAL

## 2025-03-19 VITALS
TEMPERATURE: 98 F | SYSTOLIC BLOOD PRESSURE: 135 MMHG | HEART RATE: 80 BPM | DIASTOLIC BLOOD PRESSURE: 72 MMHG | OXYGEN SATURATION: 100 % | RESPIRATION RATE: 20 BRPM

## 2025-03-19 DIAGNOSIS — J06.9 UPPER RESPIRATORY VIRUS: Primary | ICD-10-CM

## 2025-03-19 LAB
POCT INFLUENZA A: NEGATIVE
POCT INFLUENZA B: NEGATIVE
SARS-COV-2 RNA RESP QL NAA+PROBE: NOT DETECTED

## 2025-03-19 PROCEDURE — U0002 COVID-19 LAB TEST NON-CDC: HCPCS | Performed by: NURSE PRACTITIONER

## 2025-03-19 PROCEDURE — 71046 X-RAY EXAM CHEST 2 VIEWS: CPT | Performed by: NURSE PRACTITIONER

## 2025-03-19 PROCEDURE — 87502 INFLUENZA DNA AMP PROBE: CPT | Performed by: NURSE PRACTITIONER

## 2025-03-19 PROCEDURE — 99214 OFFICE O/P EST MOD 30 MIN: CPT | Performed by: NURSE PRACTITIONER

## 2025-03-19 RX ORDER — PREDNISONE 20 MG/1
40 TABLET ORAL DAILY
Qty: 10 TABLET | Refills: 0 | Status: SHIPPED | OUTPATIENT
Start: 2025-03-19 | End: 2025-03-24

## 2025-03-19 NOTE — DISCHARGE INSTRUCTIONS
Continue supportive care.  Continue using your inhaler and taking the Tessalon Perles as needed.  Follow-up with your primary care provider.  Return for any concerns.

## 2025-03-19 NOTE — ED INITIAL ASSESSMENT (HPI)
Patient is here with a cough for the last week and a half.  She states she had pneumonia a month ago and she wants to make sure she doesn't have it again

## 2025-03-19 NOTE — ED PROVIDER NOTES
He    Patient Seen in: [facility]      History   No chief complaint on file.    Stated Complaint: Cough  Subjective:   39-year-old female presents for continued URI symptoms.  She states that she had pneumonia a few months ago.  She completed her course of a cephalosporin and Zithromax.  She states she did feel better for short time, but now has had respiratory symptoms for the past 10 days or so.  She is wondering if the pneumonia never cleared completely.  She has a productive cough with green and yellow sputum, pain with cough, and nasal congestion.  She states multiple viruses have gone through her home including influenza.  Her  is sick now with URI symptoms.  She denies any recent fevers.  No chest pain or difficulty breathing.  No swelling to her lower extremities.  No sore throat or ear pain.  She is eating and drinking without nausea, vomiting, or diarrhea.  No neck stiffness.  No rashes.  No headaches.  No dizziness.  She appears nontoxic.  No recent travel.      Objective:   Past Medical History:    Anxiety    Cervical dysplasia    Mild    Depression    Dysmenorrhea    Nervous disorder    Seasonal allergies    spring    Varicella            Past Surgical History:   Procedure Laterality Date    Colonoscopy N/A 12/16/2024    Procedure: COLONOSCOPY;  Surgeon: Cassandra Curiel MD;  Location: [facility] MAIN OR    [place] teeth removed                Social History     Socioeconomic History    Marital status:    Tobacco Use    Smoking status: Former     Types: Cigarettes    Smokeless tobacco: Never   Vaping Use    Vaping status: Former   Substance and Sexual Activity    Alcohol use: Not Currently    Drug use: Never    Sexual activity: Yes     Partners: Male     Social Drivers of Health     Food Insecurity: No Food Insecurity (6/20/2022)    [facility]    Hunger Vital Sign     Worried About Running Out of Food in the Last  Year: Never true     Ran Out of Food in the Last Year: Never true   Transportation Needs: No Transportation Needs (6/20/2022)    [facility]    PRAPARE - Transportation     Lack of Transportation (Medical): No     Lack of Transportation (Non-Medical): No    [facility]            Review of Systems    Positive for stated complaint: No chief complaint on file.     Other systems are as noted in HPI.  Constitutional and vital signs reviewed.      All other systems reviewed and negative except as noted above.    Physical Exam     ED Triage Vitals [03/19/25 1723]   /72   Pulse 80   Resp 20   Temp 98.1 °F (36.7 °C)   Temp src Oral   SpO2 100 %   O2 Device None (Room air)     Current:/72   Pulse 80   Temp 98.1 °F (36.7 °C) (Oral)   Resp 20   LMP 03/12/2025 (Approximate)   SpO2 100%     Physical Exam  Vitals and nursing note reviewed.   Constitutional:       General: She is not in acute distress.     Appearance: Normal appearance. She is not toxic-appearing.   HENT:      Head: Normocephalic.      Right Ear: Tympanic membrane normal.      Left Ear: Tympanic membrane normal.      Nose: Congestion present. No rhinorrhea.      Mouth/Throat:      Mouth: Mucous membranes are moist.      Pharynx: Oropharynx is clear. No oropharyngeal exudate or posterior oropharyngeal erythema.   Eyes:      Extraocular Movements: Extraocular movements intact.      Conjunctiva/sclera: Conjunctivae normal.      Pupils: Pupils are equal, round, and reactive to light.   Cardiovascular:      Rate and Rhythm: Normal rate and regular rhythm.   Pulmonary:      Effort: Pulmonary effort is normal.      Breath sounds: Normal breath sounds. No wheezing, rhonchi or rales.   Musculoskeletal:         General: Normal range of motion.      Cervical back: Normal range of motion and neck supple.   Lymphadenopathy:      Cervical: No  cervical adenopathy.   Skin:     General: Skin is warm and dry.      Capillary Refill: Capillary refill takes less than 2 seconds.      Findings: No rash.   Neurological:      General: No focal deficit present.      Mental Status: She is alert and oriented to person, place, and time.   Psychiatric:         Mood and Affect: Mood normal.         Behavior: Behavior normal.         ED Course   XR CHEST PA + LAT CHEST (CPT=71046)    Result Date: 3/19/2025  CONCLUSION:  1. No acute cardiopulmonary finding.    Dictated by (CST): Jose A Ford MD on 3/19/2025 at 6:06 PM     Finalized by (CST): Jose A Ford MD on 3/19/2025 at 6:07 PM         Labs Reviewed   RAPID SARS-COV-2 BY PCR - Normal   POCT FLU TEST - Normal    Narrative:     This assay is a rapid molecular in vitro test utilizing nucleic acid amplification of influenza A and B viral RNA.       MDM     Medical Decision Making  The patient is aware of all the results below.  Her vital signs are normal.  She is PERC negative.  We discussed that her symptoms are most likely viral.  There have been a lot of viruses going through her home and her  is currently sick.  She has Tessalon Perles and albuterol inhaler leftover from home.  I will prescribe a course of prednisone, she has taken this in the past and it has been helpful.  We discussed the importance of close follow-up with her primary care provider if her symptoms persist or worsen.    Amount and/or Complexity of Data Reviewed  Labs: ordered.     Details: Influenza negative  COVID-negative  Radiology: ordered.     Details: I personally visualized the chest x-ray images which are negative for any pneumonia or other acute process.    Risk  OTC drugs.  Prescription drug management.  Risk Details: Pneumonia versus URI versus COVID versus influenza        Disposition and Plan     Clinical Impression:  1. Upper respiratory virus         Disposition:  Discharge  3/19/2025  6:19 pm    Follow-up:  Hermila Renteria,  MD  [address]  [facility] [address]  [phone]    Schedule an appointment as soon as possible for a visit in 3 days            Medications Prescribed:  Discharge Medication List as of 3/19/2025  6:19 PM        START taking these medications    Details   predniSONE 20 MG Oral Tab Take 2 tablets (40 mg total) by mouth daily for 5 days., Normal, Disp-10 tablet, R-0

## 2025-04-03 ENCOUNTER — OFFICE VISIT (OUTPATIENT)
Dept: INTERNAL MEDICINE CLINIC | Facility: CLINIC | Age: 40
End: 2025-04-03

## 2025-04-03 VITALS
BODY MASS INDEX: 28.35 KG/M2 | HEART RATE: 90 BPM | DIASTOLIC BLOOD PRESSURE: 78 MMHG | WEIGHT: 198 LBS | SYSTOLIC BLOOD PRESSURE: 110 MMHG | OXYGEN SATURATION: 97 % | HEIGHT: 70 IN

## 2025-04-03 DIAGNOSIS — R07.81 RIB PAIN ON RIGHT SIDE: Primary | ICD-10-CM

## 2025-04-03 DIAGNOSIS — R53.83 FATIGUE, UNSPECIFIED TYPE: ICD-10-CM

## 2025-04-03 PROCEDURE — 3074F SYST BP LT 130 MM HG: CPT | Performed by: INTERNAL MEDICINE

## 2025-04-03 PROCEDURE — 3008F BODY MASS INDEX DOCD: CPT | Performed by: INTERNAL MEDICINE

## 2025-04-03 PROCEDURE — 99214 OFFICE O/P EST MOD 30 MIN: CPT | Performed by: INTERNAL MEDICINE

## 2025-04-03 PROCEDURE — 3078F DIAST BP <80 MM HG: CPT | Performed by: INTERNAL MEDICINE

## 2025-04-03 RX ORDER — LIDOCAINE 50 MG/G
1 PATCH TOPICAL EVERY 24 HOURS
Qty: 5 EACH | Refills: 0 | Status: SHIPPED | OUTPATIENT
Start: 2025-04-03

## 2025-04-03 NOTE — PROGRESS NOTES
Subjective:     Patient ID: Janice Lraa is a 39 year old female.    Abdominal Pain        History/Other:   She came today complaining of pain on the right side.  Happened to the patient is ongoing for 2 months as she did have pneumonia 2 months ago and at that time she was having bad cough.  The pain is ongoing since then.  She feels like lately got worse.  It is tender to touch she feels pain with movement is worse when she takes deep breaths and coughs.  No new injury  She also wants to know back to supplement she can take because she is feeling tired  Review of Systems   Constitutional: Negative.    HENT: Negative.     Eyes: Negative.    Respiratory: Negative.     Cardiovascular: Negative.    Gastrointestinal:  Negative for abdominal pain.   Genitourinary: Negative.    Musculoskeletal: Negative.    Hematological: Negative.    Psychiatric/Behavioral: Negative.       Current Outpatient Medications   Medication Sig Dispense Refill    lidocaine 5 % External Patch Place 1 patch onto the skin daily. 5 each 0    Norethin Ace-Eth Estrad-FE (AUROVELA 24 FE) 1-20 MG-MCG(24) Oral Tab Take 1 tablet by mouth daily.      FLUoxetine HCl 10 MG Oral Cap Take 1 capsule (10 mg total) by mouth daily.      methylPREDNISolone (MEDROL) 4 MG Oral Tablet Therapy Pack Dosepack: take as directed (Patient not taking: Reported on 3/19/2025) 1 each 0    ondansetron 4 MG Oral Tablet Dispersible Take 1 tablet (4 mg total) by mouth every 8 (eight) hours as needed for Nausea. (Patient not taking: Reported on 1/27/2025) 8 tablet 0    BLISOVI FE 1/20 1-20 MG-MCG Oral Tab Take 1 tablet by mouth daily. (Patient not taking: Reported on 1/27/2025)      FLUoxetine HCl 40 MG Oral Cap Take 1 capsule (40 mg total) by mouth daily. (Patient not taking: Reported on 3/19/2025)       Allergies:Allergies[1]    Past Medical History:    Anxiety    Cervical dysplasia    Mild    Depression    Dysmenorrhea    Nervous disorder    Seasonal allergies    spring     Varicella      Past Surgical History:   Procedure Laterality Date    Colonoscopy N/A 12/16/2024    Procedure: COLONOSCOPY;  Surgeon: Cassandra Curiel MD;  Location: [facility] MAIN OR    [place] teeth removed        Family History   Problem Relation Age of Onset    Other (liver) Father     No Known Problems Mother     Diabetes Maternal Grandmother     Diabetes Paternal Grandmother       Social History:   Social History     Socioeconomic History    Marital status:    Tobacco Use    Smoking status: Former     Types: Cigarettes    Smokeless tobacco: Never   Vaping Use    Vaping status: Former   Substance and Sexual Activity    Alcohol use: Not Currently    Drug use: Never    Sexual activity: Yes     Partners: Male     Social Drivers of Health     Food Insecurity: No Food Insecurity (6/20/2022)    [facility]    Hunger Vital Sign     Worried About Running Out of Food in the Last Year: Never true     Ran Out of Food in the Last Year: Never true   Transportation Needs: No Transportation Needs (6/20/2022)    [facility]    PRAPARE - Transportation     Lack of Transportation (Medical): No     Lack of Transportation (Non-Medical): No   Stress: No Stress Concern Present (6/20/2022)    [facility]    [employer] - Occupational Stress Questionnaire     Feeling of Stress : Not at all    [facility]        Objective:   Physical Exam  Vitals and nursing note reviewed.   Constitutional:       Appearance: Normal appearance.   HENT:      Head: Normocephalic and atraumatic.   Cardiovascular:      Rate and Rhythm: Normal rate and regular rhythm.      Pulses: Normal pulses.      Heart sounds: Normal heart sounds.   Pulmonary:      Effort: Pulmonary effort is  normal.      Breath sounds: Normal breath sounds.   Chest:       Musculoskeletal:      Cervical back: Normal range of motion and neck supple.   Neurological:      Mental Status: She is alert.         Assessment & Plan:   1. Rib pain on right side chronic I will order CT chest, lidocaine patch   2. Fatigue, unspecified type check B12       Orders Placed This Encounter   Procedures    Vitamin B12 [E]       Meds This Visit:  Requested Prescriptions     Signed Prescriptions Disp Refills    lidocaine 5 % External Patch 5 each 0     Sig: Place 1 patch onto the skin daily.       Imaging & Referrals:  CT CHEST (CPT=71250)            [1]   Allergies  Allergen Reactions    Amoxicillin HIVES and RASH     Adverse reaction    Adverse reaction      Adverse reaction Adverse reaction Adverse reaction Adverse reaction

## 2025-04-08 ENCOUNTER — TELEPHONE (OUTPATIENT)
Dept: INTERNAL MEDICINE CLINIC | Facility: CLINIC | Age: 40
End: 2025-04-08

## 2025-04-08 NOTE — TELEPHONE ENCOUNTER
To complete the PA for this pt:    1. Go to  [email or web address] and click \"Enter a Key\"  2. Enter the pts last name, , and key.   Key: [id number]    Pts last name:Jane     :             Lidocaine 5% Patches   3. Complete the form and click \"Send to Plan\"    Please notify us when you receive the determination from the plan.

## 2025-04-10 NOTE — TELEPHONE ENCOUNTER
Called patient, no answer. Left VM informing patient of msg below.  If still needing lidocaine patches, she can  from [facility]

## 2025-04-12 ENCOUNTER — HOSPITAL ENCOUNTER (OUTPATIENT)
Dept: CT IMAGING | Facility: HOSPITAL | Age: 40
Discharge: HOME | End: 2025-04-12
Attending: INTERNAL MEDICINE
Payer: COMMERCIAL

## 2025-04-12 DIAGNOSIS — R07.81 RIB PAIN ON RIGHT SIDE: ICD-10-CM

## 2025-04-12 PROCEDURE — 71250 CT THORAX DX C-: CPT | Performed by: INTERNAL MEDICINE

## 2025-04-18 ENCOUNTER — TELEPHONE (OUTPATIENT)
Dept: INTERNAL MEDICINE CLINIC | Facility: CLINIC | Age: 40
End: 2025-04-18

## 2025-04-18 NOTE — TELEPHONE ENCOUNTER
Patient is requesting a call back to go over the CT scan results and mentions it is urgent to get a call soon.

## 2025-04-19 ENCOUNTER — TELEPHONE (OUTPATIENT)
Dept: INTERNAL MEDICINE CLINIC | Facility: CLINIC | Age: 40
End: 2025-04-19

## 2025-04-25 NOTE — TELEPHONE ENCOUNTER
Called the patient and left her a detailed message letting her know that the doctor has put in an order for blood work.

## 2025-05-08 ENCOUNTER — LAB ENCOUNTER (OUTPATIENT)
Dept: LAB | Facility: REFERENCE LAB | Age: 40
End: 2025-05-08
Attending: INTERNAL MEDICINE
Payer: COMMERCIAL

## 2025-05-08 ENCOUNTER — OFFICE VISIT (OUTPATIENT)
Dept: INTERNAL MEDICINE CLINIC | Facility: CLINIC | Age: 40
End: 2025-05-08

## 2025-05-08 VITALS
OXYGEN SATURATION: 100 % | TEMPERATURE: 98 F | RESPIRATION RATE: 18 BRPM | HEIGHT: 70 IN | SYSTOLIC BLOOD PRESSURE: 128 MMHG | BODY MASS INDEX: 28.63 KG/M2 | DIASTOLIC BLOOD PRESSURE: 68 MMHG | WEIGHT: 200 LBS | HEART RATE: 81 BPM

## 2025-05-08 DIAGNOSIS — Z02.1 PHYSICAL EXAM, PRE-EMPLOYMENT: ICD-10-CM

## 2025-05-08 DIAGNOSIS — M80.00XD PATHOLOGICAL FRACTURE OF OTHER SITE DUE TO OSTEOPOROSIS WITH ROUTINE HEALING, UNSPECIFIED OSTEOPOROSIS TYPE, SUBSEQUENT ENCOUNTER: ICD-10-CM

## 2025-05-08 DIAGNOSIS — R53.83 FATIGUE, UNSPECIFIED TYPE: ICD-10-CM

## 2025-05-08 DIAGNOSIS — Z02.1 PHYSICAL EXAM, PRE-EMPLOYMENT: Primary | ICD-10-CM

## 2025-05-08 LAB
ALBUMIN SERPL-MCNC: 4.6 G/DL (ref 3.2–4.8)
ALBUMIN/GLOB SERPL: 1.6 {RATIO} (ref 1–2)
ALP LIVER SERPL-CCNC: 129 U/L (ref 37–98)
ALT SERPL-CCNC: 16 U/L (ref 10–49)
AMPHET UR QL SCN: NEGATIVE
ANION GAP SERPL CALC-SCNC: 8 MMOL/L (ref 0–18)
AST SERPL-CCNC: 21 U/L (ref ?–34)
BARBITURATES UR QL SCN: NEGATIVE
BASOPHILS # BLD AUTO: 0.04 X10(3) UL (ref 0–0.2)
BASOPHILS NFR BLD AUTO: 0.6 %
BENZODIAZ UR QL SCN: NEGATIVE
BILIRUB SERPL-MCNC: 0.7 MG/DL (ref 0.3–1.2)
BUN BLD-MCNC: 8 MG/DL (ref 9–23)
BUN/CREAT SERPL: 10.4 (ref 10–20)
CALCIUM BLD-MCNC: 8.9 MG/DL (ref 8.7–10.4)
CANNABINOIDS UR QL SCN: NEGATIVE
CHLORIDE SERPL-SCNC: 104 MMOL/L (ref 98–112)
CO2 SERPL-SCNC: 28 MMOL/L (ref 21–32)
COCAINE UR QL: NEGATIVE
CREAT BLD-MCNC: 0.77 MG/DL (ref 0.55–1.02)
CREAT UR-SCNC: 27.9 MG/DL
DEPRECATED RDW RBC AUTO: 43.2 FL (ref 35.1–46.3)
EGFRCR SERPLBLD CKD-EPI 2021: 102 ML/MIN/1.73M2 (ref 60–?)
EOSINOPHIL # BLD AUTO: 0.11 X10(3) UL (ref 0–0.7)
EOSINOPHIL NFR BLD AUTO: 1.6 %
ERYTHROCYTE [DISTWIDTH] IN BLOOD BY AUTOMATED COUNT: 12.9 % (ref 11–15)
FASTING STATUS PATIENT QL REPORTED: NO
FENTANYL UR QL SCN: NEGATIVE
GLOBULIN PLAS-MCNC: 2.8 G/DL (ref 2–3.5)
GLUCOSE BLD-MCNC: 110 MG/DL (ref 70–99)
HCT VFR BLD AUTO: 40.8 % (ref 35–48)
HGB BLD-MCNC: 13.1 G/DL (ref 12–16)
IMM GRANULOCYTES # BLD AUTO: 0 X10(3) UL (ref 0–1)
IMM GRANULOCYTES NFR BLD: 0 %
LYMPHOCYTES # BLD AUTO: 2.23 X10(3) UL (ref 1–4)
LYMPHOCYTES NFR BLD AUTO: 33.2 %
MCH RBC QN AUTO: 29.2 PG (ref 26–34)
MCHC RBC AUTO-ENTMCNC: 32.1 G/DL (ref 31–37)
MCV RBC AUTO: 91.1 FL (ref 80–100)
MDMA UR QL SCN: NEGATIVE
METHADONE UR QL SCN: NEGATIVE
MONOCYTES # BLD AUTO: 0.45 X10(3) UL (ref 0.1–1)
MONOCYTES NFR BLD AUTO: 6.7 %
NEUTROPHILS # BLD AUTO: 3.89 X10 (3) UL (ref 1.5–7.7)
NEUTROPHILS # BLD AUTO: 3.89 X10(3) UL (ref 1.5–7.7)
NEUTROPHILS NFR BLD AUTO: 57.9 %
OPIATES UR QL SCN: NEGATIVE
OSMOLALITY SERPL CALC.SUM OF ELEC: 289 MOSM/KG (ref 275–295)
OXYCODONE UR QL SCN: NEGATIVE
PCP UR QL SCN: NEGATIVE
PLATELET # BLD AUTO: 346 10(3)UL (ref 150–450)
POTASSIUM SERPL-SCNC: 3.9 MMOL/L (ref 3.5–5.1)
PROT SERPL-MCNC: 7.4 G/DL (ref 5.7–8.2)
PTH-INTACT SERPL-MCNC: 42 PG/ML (ref 18.5–88)
RBC # BLD AUTO: 4.48 X10(6)UL (ref 3.8–5.3)
SODIUM SERPL-SCNC: 140 MMOL/L (ref 136–145)
TSI SER-ACNC: 0.84 UIU/ML (ref 0.55–4.78)
VIT B12 SERPL-MCNC: 441 PG/ML (ref 211–911)
WBC # BLD AUTO: 6.7 X10(3) UL (ref 4–11)

## 2025-05-08 PROCEDURE — 82607 VITAMIN B-12: CPT

## 2025-05-08 PROCEDURE — 80053 COMPREHEN METABOLIC PANEL: CPT

## 2025-05-08 PROCEDURE — 3074F SYST BP LT 130 MM HG: CPT | Performed by: INTERNAL MEDICINE

## 2025-05-08 PROCEDURE — 85025 COMPLETE CBC W/AUTO DIFF WBC: CPT

## 2025-05-08 PROCEDURE — 80307 DRUG TEST PRSMV CHEM ANLYZR: CPT

## 2025-05-08 PROCEDURE — 3008F BODY MASS INDEX DOCD: CPT | Performed by: INTERNAL MEDICINE

## 2025-05-08 PROCEDURE — 83970 ASSAY OF PARATHORMONE: CPT

## 2025-05-08 PROCEDURE — 36415 COLL VENOUS BLD VENIPUNCTURE: CPT

## 2025-05-08 PROCEDURE — 3078F DIAST BP <80 MM HG: CPT | Performed by: INTERNAL MEDICINE

## 2025-05-08 PROCEDURE — 99395 PREV VISIT EST AGE 18-39: CPT | Performed by: INTERNAL MEDICINE

## 2025-05-08 PROCEDURE — 86480 TB TEST CELL IMMUN MEASURE: CPT

## 2025-05-08 PROCEDURE — 84443 ASSAY THYROID STIM HORMONE: CPT

## 2025-05-08 NOTE — PROGRESS NOTES
Subjective:     Patient ID: Janice Lara is a 39 year old female.    HPI    History/Other:   She came in today for preemployment physical.  She states that she needs forms to be filled out and TB test for her new job  Review of Systems   Constitutional: Negative.    HENT: Negative.     Eyes: Negative.    Respiratory: Negative.     Cardiovascular: Negative.    Gastrointestinal: Negative.    Genitourinary: Negative.    Musculoskeletal: Negative.    Skin: Negative.    Neurological: Negative.    Hematological: Negative.    Psychiatric/Behavioral: Negative.       Current Medications[1]  Allergies:Allergies[2]    Past Medical History[3]   Past Surgical History[4]   Family History[5]   Social History: Short Social Hx on File[6]     Objective:   Physical Exam  Vitals and nursing note reviewed.   Constitutional:       Appearance: Normal appearance.   HENT:      Head: Normocephalic and atraumatic.   Cardiovascular:      Rate and Rhythm: Normal rate and regular rhythm.      Pulses: Normal pulses.      Heart sounds: Normal heart sounds.   Pulmonary:      Effort: Pulmonary effort is normal.      Breath sounds: Normal breath sounds.   Abdominal:      Palpations: Abdomen is soft.   Musculoskeletal:         General: Normal range of motion.      Cervical back: Normal range of motion and neck supple.   Skin:     General: Skin is warm and dry.   Neurological:      Mental Status: She is alert. Mental status is at baseline.   Psychiatric:         Mood and Affect: Mood normal.         Assessment & Plan:   1. Physical exam, pre-employment    Test ordered    Orders Placed This Encounter   Procedures    Drug Abuse Panel 10 screen [E]    Quantiferon TB Plus       Meds This Visit:  Requested Prescriptions      No prescriptions requested or ordered in this encounter       Imaging & Referrals:  None            [1]   Current Outpatient Medications   Medication Sig Dispense Refill    BLISOVI FE 1/20 1-20 MG-MCG Oral Tab Take 1 tablet by mouth  daily.      FLUoxetine HCl 40 MG Oral Cap Take 1 capsule (40 mg total) by mouth daily.      FLUoxetine HCl 10 MG Oral Cap Take 1 capsule (10 mg total) by mouth daily.      lidocaine 5 % External Patch Place 1 patch onto the skin daily. (Patient not taking: Reported on 5/8/2025) 10 each 0    lidocaine 5 % External Patch Place 1 patch onto the skin daily. (Patient not taking: Reported on 5/8/2025) 5 each 0    ondansetron 4 MG Oral Tablet Dispersible Take 1 tablet (4 mg total) by mouth every 8 (eight) hours as needed for Nausea. (Patient not taking: Reported on 5/8/2025) 8 tablet 0    Norethin Ace-Eth Estrad-FE (AUROVELA 24 FE) 1-20 MG-MCG(24) Oral Tab Take 1 tablet by mouth daily. (Patient not taking: Reported on 5/8/2025)     [2]   Allergies  Allergen Reactions    Amoxicillin HIVES and RASH     Adverse reaction    Adverse reaction      Adverse reaction Adverse reaction Adverse reaction Adverse reaction   [3]   Past Medical History:   Anxiety    Cervical dysplasia    Mild    Depression    Dysmenorrhea    Nervous disorder    Seasonal allergies    spring    Varicella   [4]   Past Surgical History:  Procedure Laterality Date    Colonoscopy N/A 12/16/2024    Procedure: COLONOSCOPY;  Surgeon: Cassandra Curiel MD;  Location: [facility] MAIN OR    [place] teeth removed     [5]   Family History  Problem Relation Age of Onset    Other (liver) Father     No Known Problems Mother     Diabetes Maternal Grandmother     Diabetes Paternal Grandmother    [6]   Social History  Socioeconomic History    Marital status:    Tobacco Use    Smoking status: Former     Types: Cigarettes    Smokeless tobacco: Never   Vaping Use    Vaping status: Former   Substance and Sexual Activity    Alcohol use: Not Currently    Drug use: Never    Sexual activity: Yes     Partners: Male     Social Drivers of Health     Food Insecurity: No Food Insecurity (6/20/2022)    Received from [facility]    Hunger Vital Sign     Worried About  Running Out of Food in the Last Year: Never true     Ran Out of Food in the Last Year: Never true   Transportation Needs: No Transportation Needs (6/20/2022)    Received from [facility]    PRAPARE - Transportation     Lack of Transportation (Medical): No     Lack of Transportation (Non-Medical): No   Stress: No Stress Concern Present (6/20/2022)    Received from [facility]    [employer] [place] [employer] - Occupational Stress Questionnaire     Feeling of Stress : Not at all    Received from [facility]

## 2025-05-09 ENCOUNTER — PATIENT MESSAGE (OUTPATIENT)
Dept: INTERNAL MEDICINE CLINIC | Facility: CLINIC | Age: 40
End: 2025-05-09

## 2025-05-09 ENCOUNTER — NURSE TRIAGE (OUTPATIENT)
Dept: INTERNAL MEDICINE CLINIC | Facility: CLINIC | Age: 40
End: 2025-05-09

## 2025-05-09 NOTE — TELEPHONE ENCOUNTER
DR Hermila Renteria ==please answer questions number 1   and 2  ..  CLINICAL STAFF =please assist .        Patient called , RN verified name and date of birth ;       1. Requesting a repeat test for  the   glucose level and alkaline phosphatase, before she see the endocrinologist . At this time, she wants it to be fasting .       2. WORK FORM == requesting the completed form  to be sent via [employer] (1 page only).    3. She is scheduled for a bone scan tomorrow == informed about Dr. Hermila Renteria's recommendation; she will cancel the appointment ( see labs note below )    4. Why are her alkaline phosphatase levels high? === Possible liver issue or bone disorder.The specialist will explain more and do additional test if needed.     5. Requesting a copy of the drug  urine test == RN sent the copy through [employer]  (see communication tab ) .    6. TB test == informed that it is still in process; no results yet.        LABS 5/8/25:    Hermila Renteria MD  5/8/2025  2:56 PM CDT Back to Top      Hemoglobin is okay, drug screening is okay thyroid is okay vitamin B12 is okay her alk phos is high higher than before.  I will suggest to follow-up with endocrinology.  It is okay to cancel bone scan , see first endocrinology         Future Appointments   Date Time Provider Department Center   5/10/2025 10:40 AM [facility] [place]

## 2025-05-09 NOTE — TELEPHONE ENCOUNTER
Forms will be completed once we have TB results.  I can order repeat alk phos but I do not think that she needs to do at this time since we know that was high when before I will suggest to follow-up with endocrinology if she is insisting to do I will order

## 2025-05-10 LAB
M TB IFN-G CD4+ T-CELLS BLD-ACNC: 0.08 IU/ML
M TB TUBERC IFN-G BLD QL: NEGATIVE
M TB TUBERC IGNF/MITOGEN IGNF CONTROL: >10 IU/ML
QFT TB1 AG MINUS NIL: 0 IU/ML
QFT TB2 AG MINUS NIL: 0.02 IU/ML

## 2025-05-12 ENCOUNTER — TELEPHONE (OUTPATIENT)
Dept: INTERNAL MEDICINE CLINIC | Facility: CLINIC | Age: 40
End: 2025-05-12

## 2025-05-12 NOTE — TELEPHONE ENCOUNTER
Clinical staff please assist. You will need to scan the document to yourself via email, then save to your hard drive then click the upload button in the [employer] message in order to attach and send to pt via [employer].

## 2025-05-12 NOTE — TELEPHONE ENCOUNTER
CLINICAL STAFF -=please assist .     Patient called back and requesting to send the form through [employer] (says only 1 page ).

## 2025-05-12 NOTE — TELEPHONE ENCOUNTER
Called the patient after completing the form for her employer.  I asked her to call me back and let me know if she wants me to mail the form or is she going to pick it.  I left her a VM.

## 2025-05-12 NOTE — TELEPHONE ENCOUNTER
Please call her I gave the forms to Yaneli, she needs to come and  the form or we can fax wherever she wants to we cannot send to personal email

## 2025-05-13 ENCOUNTER — MED REC SCAN ONLY (OUTPATIENT)
Dept: INTERNAL MEDICINE CLINIC | Facility: CLINIC | Age: 40
End: 2025-05-13

## 2025-05-13 NOTE — TELEPHONE ENCOUNTER
The patients form was completed.  I filled out her medications and her allergies on the form.  Called the patient to see how she wanted to receive and she advised me to mail it.  I made a copy and sent to scanning and sent the original in the mail to the patient.

## 2025-06-10 ENCOUNTER — LAB ENCOUNTER (OUTPATIENT)
Dept: LAB | Facility: REFERENCE LAB | Age: 40
End: 2025-06-10
Attending: INTERNAL MEDICINE
Payer: COMMERCIAL

## 2025-06-10 DIAGNOSIS — R74.8 ELEVATED ALKALINE PHOSPHATASE LEVEL: ICD-10-CM

## 2025-06-10 DIAGNOSIS — R73.09 ELEVATED GLUCOSE LEVEL: ICD-10-CM

## 2025-06-10 LAB
ALP LIVER SERPL-CCNC: 113 U/L (ref 37–98)
FASTING PATIENT GLUCOSE ANSWER: YES
GLUCOSE BLD-MCNC: 87 MG/DL (ref 70–99)

## 2025-06-10 PROCEDURE — 82947 ASSAY GLUCOSE BLOOD QUANT: CPT

## 2025-06-10 PROCEDURE — 84075 ASSAY ALKALINE PHOSPHATASE: CPT

## 2025-06-10 PROCEDURE — 36415 COLL VENOUS BLD VENIPUNCTURE: CPT

## 2025-06-18 ENCOUNTER — HOSPITAL ENCOUNTER (OUTPATIENT)
Age: 40
Discharge: HOME | End: 2025-06-18
Payer: COMMERCIAL

## 2025-06-18 VITALS
SYSTOLIC BLOOD PRESSURE: 111 MMHG | DIASTOLIC BLOOD PRESSURE: 89 MMHG | HEART RATE: 96 BPM | RESPIRATION RATE: 16 BRPM | OXYGEN SATURATION: 99 % | TEMPERATURE: 98 F

## 2025-06-18 DIAGNOSIS — H01.001 BLEPHARITIS OF UPPER EYELIDS OF BOTH EYES, UNSPECIFIED TYPE: Primary | ICD-10-CM

## 2025-06-18 DIAGNOSIS — H10.13 ALLERGIC CONJUNCTIVITIS OF BOTH EYES: ICD-10-CM

## 2025-06-18 DIAGNOSIS — H01.004 BLEPHARITIS OF UPPER EYELIDS OF BOTH EYES, UNSPECIFIED TYPE: Primary | ICD-10-CM

## 2025-06-18 PROCEDURE — 99213 OFFICE O/P EST LOW 20 MIN: CPT | Performed by: NURSE PRACTITIONER

## 2025-06-18 RX ORDER — OLOPATADINE HYDROCHLORIDE 2 MG/ML
1 SOLUTION OPHTHALMIC DAILY
Qty: 2.5 ML | Refills: 0 | Status: SHIPPED | OUTPATIENT
Start: 2025-06-18

## 2025-06-18 NOTE — ED INITIAL ASSESSMENT (HPI)
Pt c/o bilateral eye itching seen dermatologist for itching/dry eyelid symptoms started 2 month ago. Pt prescribed tacrolimus ointment from dermatologist but has not started medication yet.

## 2025-06-18 NOTE — DISCHARGE INSTRUCTIONS
Patanol, pataday, for allergic eyes, also thera tears.     I would also start taking an allergy medicine such as zyrtec or allegra.     EYE IRRITATION: Your eye irritation is likely due to either allergic conjunctivitis or contact dermatitis, which means your eyes are reacting to an allergen or irritant. We have prescribed Pataday (olopatadine) eye drops, which you should use by placing one drop in each eye once daily. Additionally, you can use lubricating eye drops like Theratears to keep your eyes moist. If your symptoms persist, we may consider allergy testing. -BLEPHARITIS: Blepharitis is a condition that causes chronic irritation and dryness of the eyelids. It may be due to an allergic reaction or contact dermatitis, similar to eczema. You should start using the tacrolimus ointment as prescribed by your dermatologist. We also recommend using cold compresses and avoiding touching the eye area. Aquaphor is safe to use around your eyes to help with dryness. INSTRUCTIONS: Please follow up with your dermatologist regarding the use of tacrolimus ointment and any additional treatments they may recommend. If your eye irritation persists or worsens, consider scheduling an appointment for allergy testing.

## 2025-06-18 NOTE — ED PROVIDER NOTES
Patient Seen in: [facility]       The following individual(s) verbally consented to be recorded using ambient AI listening technology and understand that they can each withdraw their consent to this listening technology at any point by asking the clinician to turn off or pause the recording:    Patient name: Janice Lara        History  Chief Complaint   Patient presents with    Irritation    Eye Problem     Stated Complaint: Eye Problem    Subjective:   HPI     Janice Lara is a 39 year old female who presents with eye irritation and suspected eye infection.    She has experienced eye irritation for 1.5 to 2 months, with dry, irritated skin around her eyes and itching of the eyeballs. She has avoided all products, including makeup, and uses Aquaphor for dryness. She wears glasses and occasionally uses contacts, which she recently removed due to discomfort. She has not used any eye drops or allergy medications. No symptoms of seasonal allergies such as postnasal drip, coughing, or sneezing. She visited a dermatologist last weekend and is awaiting a prescribed topical cream.        Objective:     Past Medical History:    Anxiety    Cervical dysplasia    Mild    Depression    Dysmenorrhea    Nervous disorder    Seasonal allergies    spring    Varicella              Past Surgical History:   Procedure Laterality Date    Colonoscopy N/A 12/16/2024    Procedure: COLONOSCOPY;  Surgeon: Cassandra Curiel MD;  Location: [facility] MAIN OR    [place] teeth removed                  Social History     Socioeconomic History    Marital status:    Tobacco Use    Smoking status: Former     Types: Cigarettes    Smokeless tobacco: Never   Vaping Use    Vaping status: Former   Substance and Sexual Activity    Alcohol use: Not Currently    Drug use: Never    Sexual activity: Yes     Partners: Male     Social Drivers of Health     Food Insecurity: No Food Insecurity (6/20/2022)    Received from [facility]  [facility] [place]    Hunger Vital Sign     Worried About Running Out of Food in the Last Year: Never true     Ran Out of Food in the Last Year: Never true   Transportation Needs: No Transportation Needs (6/20/2022)    Received from [facility]    PRAPARE - Transportation     Lack of Transportation (Medical): No     Lack of Transportation (Non-Medical): No    Received from [facility]              Review of Systems    Positive for stated complaint: Eye Problem  Other systems are as noted in HPI.  Constitutional and vital signs reviewed.      All other systems reviewed and negative except as noted above.                  Physical Exam    ED Triage Vitals [06/18/25 1353]   /89   Pulse 96   Resp 16   Temp 98.3 °F (36.8 °C)   Temp src Oral   SpO2 99 %   O2 Device None (Room air)       Current Vitals:   Vital Signs  BP: 111/89  Pulse: 96  Resp: 16  Temp: 98.3 °F (36.8 °C)  Temp src: Oral    Oxygen Therapy  SpO2: 99 %  O2 Device: None (Room air)      Right Eye Chart Acuity: 20/25, Corrected  Left Eye Chart Acuity: 20/25, Corrected      Physical Exam  Vitals and nursing note reviewed.   Constitutional:       General: She is not in acute distress.     Appearance: She is not toxic-appearing.   HENT:      Head: Normocephalic.      Nose: Nose normal. No congestion or rhinorrhea.      Mouth/Throat:      Mouth: Mucous membranes are moist.   Eyes:      General: Vision grossly intact.         Right eye: No discharge.         Left eye: No discharge.      Extraocular Movements: Extraocular movements intact.      Right eye: Normal extraocular motion and no nystagmus.      Left eye: Normal extraocular motion and no nystagmus.      Pupils: Pupils are equal, round, and reactive to light.      Right eye: Pupil is round, reactive and not sluggish.      Left eye: Pupil is round, reactive and not sluggish.      Funduscopic exam:     Right eye: Red reflex present.         Left eye: Red reflex present.      Comments: Eyelids are dry and cw atopic dermatitis   Pulmonary:      Effort: Pulmonary effort is normal. No respiratory distress.   Abdominal:      General: Abdomen is flat.   Musculoskeletal:         General: Normal range of motion.      Cervical back: Normal range of motion.   Skin:     General: Skin is warm and dry.      Capillary Refill: Capillary refill takes less than 2 seconds.   Neurological:      General: No focal deficit present.      Mental Status: She is alert.                 ED Course  Labs Reviewed - No data to display                         MDM            Medical Decision Making  38yo female p/w bilateral eye irritation.   Eye irritation Chronic itching and irritation exacerbated by contact lens use. No infection or seasonal allergies. Possible allergic conjunctivitis or contact dermatitis. - Prescribed Pataday (olopatadine) eye drops, one drop in each eye once daily. - Recommended lubricating eye drops such as Theratears for additional moisture. - Consider allergy testing if symptoms persist. Blepharitis Chronic eyelid irritation and dryness. Tacrolimus ointment prescribed but not yet initiated. No infection. Possible allergic reaction or contact dermatitis, resembling eczema. Thin and vascular periorbital skin requires careful treatment to avoid ocular exposure. - Advise use of tacrolimus ointment as prescribed by dermatologist. - Recommend cold compresses and avoidance of eye area. - Aquaphor is safe for use around the eyes.     Physical exam remained stable over serial reexaminations as previously documented. External chart review completed. No recent hospitalizations for the same.      I have discussed with the patient the results of tests, differential diagnosis, and warning signs and symptoms that should prompt immediate return.  The patient understands these instructions and agrees to the follow-up plan provided.  There are no barriers to learning.   Appropriate f/u given.  Patient agrees  to return for any concerns/problems/complications.    Differential diagnosis reflecting the complexity of care include: atopic dermatitis, blepharitis, allergic conjunctivitis    Comorbidities that add complexity to management include:na    External chart review was done and was noted:Yes    History obtained by an independent source was from: Patient/Wife//Parent    Discussions of management was done with:Patient    My independent interpretation of studies of:na    Diagnostic tests and medications considered but not ordered were:na    Social determinants of health that affect care:NA    Shared decision making was done by Self, Patient          Disposition and Plan     Clinical Impression:  1. Blepharitis of upper eyelids of both eyes, unspecified type    2. Allergic conjunctivitis of both eyes         Disposition:  Discharge  6/18/2025  2:16 pm    Follow-up:  Hermila Renteria MD  [address] [place] [id number]  [phone]                Medications Prescribed:  Discharge Medication List as of 6/18/2025  2:20 PM        START taking these medications    Details   Olopatadine HCl 0.2 % Ophthalmic Solution Apply 1 drop to eye daily., Normal, Disp-2.5 mL, R-0                   Supplementary Documentation:

## 2025-07-23 ENCOUNTER — MED REC SCAN ONLY (OUTPATIENT)
Dept: INTERNAL MEDICINE CLINIC | Facility: CLINIC | Age: 40
End: 2025-07-23

## 2025-07-29 ENCOUNTER — OFFICE VISIT (OUTPATIENT)
Facility: CLINIC | Age: 40
End: 2025-07-29
Payer: COMMERCIAL

## 2025-07-29 VITALS
OXYGEN SATURATION: 95 % | SYSTOLIC BLOOD PRESSURE: 122 MMHG | DIASTOLIC BLOOD PRESSURE: 64 MMHG | WEIGHT: 191.19 LBS | BODY MASS INDEX: 27.37 KG/M2 | HEART RATE: 91 BPM | HEIGHT: 70 IN

## 2025-07-29 DIAGNOSIS — M79.652 THIGH PAIN, MUSCULOSKELETAL, LEFT: Primary | ICD-10-CM

## 2025-07-29 DIAGNOSIS — S22.42XD CLOSED FRACTURE OF MULTIPLE RIBS OF LEFT SIDE WITH ROUTINE HEALING, SUBSEQUENT ENCOUNTER: ICD-10-CM

## 2025-07-29 DIAGNOSIS — R74.8 HIGH ALKALINE PHOSPHATASE: ICD-10-CM

## 2025-07-29 PROCEDURE — 99204 OFFICE O/P NEW MOD 45 MIN: CPT | Performed by: STUDENT IN AN ORGANIZED HEALTH CARE EDUCATION/TRAINING PROGRAM

## 2025-07-29 PROCEDURE — 3008F BODY MASS INDEX DOCD: CPT | Performed by: STUDENT IN AN ORGANIZED HEALTH CARE EDUCATION/TRAINING PROGRAM

## 2025-07-29 PROCEDURE — 3074F SYST BP LT 130 MM HG: CPT | Performed by: STUDENT IN AN ORGANIZED HEALTH CARE EDUCATION/TRAINING PROGRAM

## 2025-07-29 PROCEDURE — 3078F DIAST BP <80 MM HG: CPT | Performed by: STUDENT IN AN ORGANIZED HEALTH CARE EDUCATION/TRAINING PROGRAM

## (undated) NOTE — LETTER
1/21/2025          Janice Lara    [address]    [facility] [address]         Dear Janice,    Our records indicate that the tests ordered for you by Cassandra Curiel MD  have not been done.  If you have, in fact, already completed the tests or you do not wish to have the tests done, please contact our office at THE NUMBER LISTED BELOW.  Otherwise, please proceed with the testing.  Enclosed is a duplicate order for your convenience.    Labs Order:    Alkaline Phosphatase Isoenzymes, Serum or Plasma (Order #[id number]) on 12/11/24             Sincerely,    Cassandra Curiel MD  [facility]  [address] [id number] [phone]

## (undated) NOTE — LETTER
Date & Time: 1/30/2025, 9:43 AM  Patient: Janice Lara  Encounter Provider(s):    Marisol Castano APRN       To Whom It May Concern:    Janice Lara was seen and treated in our department on 1/30/2025. She should not return to work until 1/31/2025 if fever free for 24 hours .    If you have any questions or concerns, please do not hesitate to call.        _____________________________  Physician/APC Signature

## (undated) NOTE — LETTER
Date & Time: 1/27/2025, 9:37 AM  Patient: Janice Lara  Encounter Provider(s):    Rachael Bowden APRN       To Whom It May Concern:    Janice Lara was seen and treated in our department on 1/27/2025. She should not return to work until fever free x 24 hours and feeling better .    If you have any questions or concerns, please do not hesitate to call.        _____________________________  Physician/APC Signature

## (undated) NOTE — LETTER
2025    Janice Lara  [address]  [facility] [address]     Janice Lara  Patient Summary  MRN: [id number] Description: Female : 1985     Result  Drug Abuse Panel 10 screen [E] (Order [id number])  Order Providers    Authorizing Encounter   Hermila Renteria REF [facility]            Drug Abuse Panel 10 screen [E] [[id number]]  Resulted: 25 1440, Result status: Final result  Resulting lab: [facility] ([facility])     Specimen Information    ID Type Source Collected On   [id number] Urine -- 25 1051     Components    Component Value Reference Range Flag   Amphetamine Urine Negative Negative --   Comment: Amphetamine Cut-off Value: 1000 ng/mL   Barbiturates Urine Negative Negative --   Comment: Barbiturate Cut-off Value: 200 ng/mL   Benzodiazepines Urine Negative Negative --   Comment: Benzodiazepine Cut-off Value: 200 ng/mL   Cannabinoid Urine Negative Negative --   Comment: Cannabinoid Cut-off Value: 50 ng/mL   Cocaine Urine Negative Negative --   Comment: Cocaine Cut-off Value: 300 ng/mL   Ecstasy Urine Negative Negative --   Comment: Ecstasy Assay Cutoff: 500 ng/mL   Fentanyl Urine Negative Negative --   Comment: Fentanyl Cut-off Value: 1.0 ng/mL   Methadone Urine Negative Negative --   Comment: Methadone Assay Cutoff: 300 ng/mL   Opiate Urine Negative Negative --   Comment: Opiate Cut-off Value: 300 ng/mL     Oxycodone Urine Negative Negative --   Comment: Oxycodone Assay Cutoff: 300 ng/mL   PCP Urine Negative Negative --   Comment: Phencyclidine Cut-off Value: 25 ng/mL   Creatinine Ur Random 27.90 mg/dL --   Comment: No established reference values for this test.       Testing Performed By    Lab - Abbreviation Name Director Address Valid Date Range   162 - [facility] ([facility]) [facility] ([facility]) Talib Watkins EChaparrita Roberto [facility] [address] 20 1442 - Present                   HERMILA RENTERIA MD  [address] [place] [address]  Ph: [phone]  Fax: [phone]             Document electronically generated by:  Tosha ELIZALDE RN